# Patient Record
(demographics unavailable — no encounter records)

---

## 2024-10-08 NOTE — PHYSICAL EXAM
[TextEntry] : GENERAL: Height: 81 cm (84th percentile), weight: 26.5 kg (>>97th percentile, 50th for 8 years 6 months). Alert, active and extreme obesity HEAD:	Normocephalic. Anterior and posterior fontanelles are closed HC; 49.75 cm (97th percentile).  FACE:	Normal midface EYES:	Normal brows, lids and lashes. Narro palpebral fissures. 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  Irises and pupils normal. NOSE: Depressed nasal root with short nose and anteverted nares MOUTH:	Normal lips and philtrum. Normal tongue, gums and teeth with normal enamel.  Palate not visualized NECK:	Full range of motion with no webbing, cysts or pits. No thyroid enlargement. Normal posterior hairline. CHEST:	Normal clavicles and scapulae. No pectus ABDOMEN:	Soft, non-distended, non-tender.  No hepatosplenomegaly (although difficult to fully examine secondary to obesity and uncooperativeness.  SPINE:	No kyphosis or scoliosis. NEUROLOGIC:	Alert, interactive.  Grossly normal muscle tone, strength and motor coordination. GENITALIA: Not examined NAILS/HAIR/SKIN:	Normal nails and hair. No abnormally healed scars; no neurocutaneous lesions or birthmarks. No axillary or inguinal freckling. EXTREMITIES:	Full range of motion in all extremities. Normal patellae by palpation. Full extension and supination at elbows BL. HANDS & FEET:	No syndactyly or polydactyly.  Hands with two palmar creases and no clinodactyly. Palm length 6 cm (75-97th%), Middle finger length 3.5 cm (3-25th%), Foot length 13 cm (75-97th%).

## 2024-10-08 NOTE — PLAN
[TextEntry] : Recommendations: 1) Continued follow-up with Endocrinology (Dr. Kerline Beasley) 2) Discus initiating therapy (e.g. metreleptin) with Endocrinology 3) Identify an appropriate nutritionist to help the family manage Aneta's calorie intake and work on some behavioral strategies to manage her hyperplasia (we will reach out to the "[facility]" program here at [facility] [place] to see if Nu qualifies). 4) Referral to immunology for base-line evaluation

## 2024-10-08 NOTE — PLAN
[TextEntry] : Recommendations: 1) Continued follow-up with Endocrinology (Dr. Kerline Beasley) 2) Discus initiating therapy (e.g. metreleptin) with Endocrinology 3) Identify an appropriate nutritionist to help the family manage Aneta's calorie intake and work on some behavioral strategies to manage her hyperplasia (we will reach out to the "[facility]" program [place] [facility] qual[place]). 4) Referral to immunology for base-line evaluation

## 2024-10-08 NOTE — REASON FOR VISIT
[FreeTextEntry3] : Joel is a 15 mo female with LEP-related leptin deficiency presenting for follow up and further discussion of weight gain.

## 2024-10-08 NOTE — REVIEW OF SYSTEMS
[Negative] : Psychiatric [FreeTextEntry2] : severe obesity  [FreeTextEntry6] : sleep apnea [FreeTextEntry7] : hyperplasia, obesity [de-identified] : leptin deficiency - obesity, hypothyroidism  [de-identified] : developmental delays

## 2024-10-08 NOTE — HISTORY OF PRESENT ILLNESS
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 Since her last visit for a results disclosure with Dr. Appiah she has been seen by Endo to discuss medical management options. Per endo note they are initiate the process for Myalept treatment. Parents have reported that she has not heard back about the medication.   She has a sleep study scheduled on 10/16 and a nutritionist appointment scheduled for 11/13. This will be her first appointment with a nutritionist. She was evaluated by EI last week and parents are waiting on approval. She is unable to crawl or walk. She was able to say "mama" and "reese" at 12 mo. She is able to point at things she wants and will cry when she wants food.

## 2024-10-08 NOTE — ASSESSMENT
[FreeTextEntry1] : Nu is a 0-lulr-3-month-old female with obesity, hyperplasia, hypothyroidism, sleep apnea and developmental delays (She is unable to crawl or walk. She was able to say "mama" and "reese" at 12 mo. She is able to point at things she wants and will cry when she wants food.).  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 Since her last visit for a results disclosure with Dr. Appiah she has been seen by Endocrinology to discuss medical management options. Per endocrine's note they are initiating the process for Myalept treatment. Parents have reported that she has not heard back about the medication.  Family history is notable for parental consanguinity.  On exam Nu has severe obesity, large he'd circumference and mild dysmorphic features (narrow eyelids, telecanthus, depressed nasal root, short nose and anteverted nares).  Aneta's clinical findings are encompassed by her underlying diagnosis of leptin-deficiciency due to homozygous pathogenic variants in the leptin gene.   We discussed dates diagnosis, inheritance and management issues with Aneta's mother and father who asked insightful questions and understands adama diagnosis and issues well.  they are somewhat overwhelmed with Joanns behavior and hyperplasia and anxious to get her on more targets therapy (e.g. metreleptin).  We would los like to find an appropriate nutritionist to help adama family mage Aneta's calorie intake and work on some behavioral strategies to manage her hyperplasia (we will reach out to the "[facility]" program here at [facility] [place] [place] to see if Nu qualifies).

## 2024-10-08 NOTE — REVIEW OF SYSTEMS
[Negative] : Psychiatric [FreeTextEntry2] : severe obesity  [FreeTextEntry6] : sleep apnea [FreeTextEntry7] : hyperplasia, obesity [de-identified] : leptin deficiency - obesity, hypothyroidism  [de-identified] : developmental delays

## 2024-10-08 NOTE — HISTORY OF PRESENT ILLNESS
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 Since her last visit for a results disclosure with Dr. Appiah she has been seen by Endo to discuss medical management options. Per endo note they are initiate the process for Myalept treatment. Parents have reported that she has not heard back about the medication.   She has a sleep study scheduled on 10/16 and a nutritionist appointment scheduled for 11/13. This will be her first appointment with a nutritionist. She was evaluated by EI last week and parents are waiting on approval. She is unable to crawl or walk. She was able to say "mama" and "reese" at 12 mo. She is able to point at things she wants and will cry when she wants food.

## 2024-10-08 NOTE — ASSESSMENT
[FreeTextEntry1] : Nu is a 1-xsxt-8-month-old female with obesity, hyperplasia, hypothyroidism, sleep apnea and developmental delays (She is unable to crawl or walk. She was able to say "mama" and "reese" at 12 mo. She is able to point at things she wants and will cry when she wants food.).  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 Since her last visit for a results disclosure with Dr. Appiah she has been seen by Endocrinology to discuss medical management options. Per endocrine's note they are initiating the process for Myalept treatment. Parents have reported that she has not heard back about the medication.  Family history is notable for parental consanguinity.  On exam Nu has severe obesity, large he'd circumference and mild dysmorphic features (narrow eyelids, telecanthus, depressed nasal root, short nose and anteverted nares).  Aneta's clinical findings are encompassed by her underlying diagnosis of leptin-deficiciency due to homozygous pathogenic variants in the leptin gene.   We discussed dates diagnosis, inheritance and management issues with Aneta's mother and father who asked insightful questions and understands adama diagnosis and issues well.  they are somewhat overwhelmed with Joanns behavior and hyperplasia and anxious to get her on more targets therapy (e.g. metreleptin).  We would los like to find an appropriate nutritionist to help adama family mage Aneta's calorie intake and work on some behavioral strategies to manage her hyperplasia (we will reach out to the "[facility]" program here at [facility] [place] to see if Nu qualifies).

## 2024-10-10 NOTE — HISTORY OF PRESENT ILLNESS
[de-identified] : Sick - cough cold [FreeTextEntry6] : Fever last night Cough and congestion Drinking well Normal peeing Also constipated, straining to stool

## 2024-10-10 NOTE — DISCUSSION/SUMMARY
[FreeTextEntry1] : 15 mo old with viral URI, congestion. Worse when laying down due to obstruction from her obesity., Enforced need for saline neb and chest PT to help w airway clearance.  Trial miralax for constipation.  O2 sat  on room air on 2 out of 3 attempts (pt kept moving and screaming leading to poor readings.) If any respiratory distress develops need to take her to ED. Rapid covid and flu neg. Parents decline PCR testing.

## 2024-10-10 NOTE — HISTORY OF PRESENT ILLNESS
[de-identified] : Sick - cough cold [FreeTextEntry6] : Fever last night Cough and congestion Drinking well Normal peeing Also constipated, straining to stool

## 2024-10-18 NOTE — PLAN
[TextEntry] : Due to severity of nasal congestion, sent directly from here to [facility] for immediate further evaluation.

## 2024-10-18 NOTE — HISTORY OF PRESENT ILLNESS
[de-identified] : Congestion [FreeTextEntry6] : Severe nasal congestion and difficulty sleeping last night

## 2024-10-23 NOTE — PHYSICAL EXAM
[Healthy Appearing] : healthy appearing [Well Nourished] : well nourished [Interactive] : interactive [Normal Appearance] : normal appearance [Well formed] : well formed [Normally Set] : normally set [Abdomen Soft] : soft [Abdomen Tenderness] : non-tender [] : no hepatosplenomegaly [Normal] : normal  [Obese] : obese [Upper Airway Sounds] : transmitted upper airway sounds [de-identified] : morbidly obese,  [de-identified] : audible upper respiratory noises.

## 2024-10-23 NOTE — CONSULT LETTER
[Dear  ___] : Dear  [unfilled], [Courtesy Letter:] : I had the pleasure of seeing your patient, [unfilled], in my office today. [Please see my note below.] : Please see my note below. [Consult Closing:] : Thank you very much for allowing me to participate in the care of this patient.  If you have any questions, please do not hesitate to contact me. [Sincerely,] : Sincerely, [FreeTextEntry3] : Savanna Trujillo MD Pediatric Endocrinology Fellow, PGY5 [facility] Pediatric Endocrinology [facility]  Kerline Beasley MD

## 2024-10-23 NOTE — ASSESSMENT
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  Reviewed effects of leptin on satiety, thyroid, puberty and immune system. We had an extended discussion with both parents about medication Reviewed with parents we received IND expanded access (compassionate use) approval from FDA for this 10/10/24 and [facility] 10/18/24. Currently awaiting first shipment of myalept from  to pharmacy. They are here to review the consent form for expanded access for myalept. Parents refused Albanian  and report feeling comfortable with being consented in English. The following was reviewed: Because this treatment is considered experimental and has not received FDA approval, there may be risks that we do not know about at this time. Their participation is voluntary and the medication can be stopped at any time. Alternatives to treatment include strict diet control. There is no FDA approved treatment for her condition at this time however there are reports of metreleptin (myalept) being beneficial. Reviewed the most common reported side effects include: Headache, Hypoglycemia ([low blood sugar] which has been seen when used concurrently with insulin), Decreased weight, Abdominal pain Some people who use this medication make antibodies in their blood that may reduce how well the leptin in their body (endogenous) works or how well the medication works. Side effects of this include: Infection, Problems with blood sugar, including diabetes, An increase in the amount of fat in the blood (triglycerides), T-cell lymphoma (cancer) has been reported in patients with acquired generalized lipodystrophy (both treated and not treated with metreleptin), Generalized hypersensitivity (e.g., urticaria [hives], generalized rash) has been reported. Benefits would include: Treatment with Myalept may benefit Aneta by causing weight loss and eventual weight stabilization. This should help with issues associated with weight gain such as sleep apnea and will hopefully prevent issues with high blood sugars and lipids. Signed witnessed consent form has been uploaded into the patient's chart. Provided parents with a review article PMID [id number] for better understanding of leptin treatment. Today we ordered initial (non fasting) labs including: hemoglobin A1C, Leptin level, CMP, CBC, fasting insulin, lipid panel,  Will obtain TFTs to monitor levothyroxine therapy: T4, FT4 and TSH.  Medication plan: Once medication received - parents will come to the office and be trained on how to give the injections and store the medication. The first dose will be given in the office The dose of the medication will be adjusted as per her weight and the effect and can range from 0.02 mg/kg/day to 0.13 mg/kg/day of lean body weight. Planned starting dose is 0.03mg/kg/d of lean body weight.  Will reach out to the family with appointment once estimated shipment date/time has been confirmed with drug .  Dad had many questions and was anxious about side effects . We discussed that there is no alternative therapy at this time with the exception of diet control. We discussed that this would not help with immune effects . We discussed how proceeding with treatment was the family's' decision.   She will follow up with us every 2 weeks after starting therapy initially and subsequently monthly.  Parents will see weight management (Dr Ayala) next week along with nutrition and have follow up with endocrine nutrition in 3 weeks. Will call with labs results when available.

## 2024-10-23 NOTE — HISTORY OF PRESENT ILLNESS
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 She was diagnosed with hypothyroidism in [place] and in June we have lowered her levothyroxine dose to 25mcg daily due to suppressed TSH. They are here to discuss treatment with metreleptin. In the interim since last visit with us in August, we have received FDA and IRB approval to start treatment with metreleptin (Myalept) and we are currently awaiting the first medication shipment from  ([facility]). Since last visit in 14 Aug 2024, Aneta has gained 4.72kg over 2 months.  Development - she has gross motor delay due to excess weight, she cannot walk or crawl. Can't flip from stomach to back and back to stomach. Speech: "mama" and "Baba", no other words. Diet: always hungry and always eating. Drinking water and low fat milk. They dont think she can fast for labs as she wants food as soon as she wakes up and sometimes wakes up at night and wants something to go back to sleep. She was recently cleared by cardiology for ENT surgery (T&A) and they are following with pulm for possible SILVINA - but the sleep study was rescheduled. Parents report her oxygen levels are sometimes low on her visits. She has been congested. In the past 2 months only had to go to ER once - Oct 18 but has otherwise been well since last visit. details…

## 2024-10-23 NOTE — HISTORY OF PRESENT ILLNESS
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 She was diagnosed with hypothyroidism in [place] and in June we have lowered her levothyroxine dose to 25mcg daily due to suppressed TSH. They are here to discuss treatment with metreleptin. In the interim since last visit with us in August, we have received FDA and IRB approval to start treatment with metreleptin (Myalept) and we are currently awaiting the first medication shipment from  ([facility]). Since last visit in 14 Aug 2024, Aneta has gained 4.72kg over 2 months.  Development - she has gross motor delay due to excess weight, she cannot walk or crawl. Can't flip from stomach to back and back to stomach. Speech: "mama" and "Baba", no other words. Diet: always hungry and always eating. Drinking water and low fat milk. They dont think she can fast for labs as she wants food as soon as she wakes up and sometimes wakes up at night and wants something to go back to sleep. She was recently cleared by cardiology for ENT surgery (T&A) and they are following with pulm for possible SILVINA - but the sleep study was rescheduled. Parents report her oxygen levels are sometimes low on her visits. She has been congested. In the past 2 months only had to go to ER once - Oct 18 but has otherwise been well since last visit.

## 2024-10-23 NOTE — CONSULT LETTER
[Dear  ___] : Dear  [unfilled], [Courtesy Letter:] : I had the pleasure of seeing your patient, [unfilled], in my office today. [Please see my note below.] : Please see my note below. [Consult Closing:] : Thank you very much for allowing me to participate in the care of this patient.  If you have any questions, please do not hesitate to contact me. [Sincerely,] : Sincerely, [FreeTextEntry3] : Savanna Trujillo MD Pediatric Endocrinology Fellow, PGY5 [facility] Pediatric Endocrinology [facility]  Kerline Beasley MD

## 2024-10-23 NOTE — PHYSICAL EXAM
[Healthy Appearing] : healthy appearing [Well Nourished] : well nourished [Interactive] : interactive [Normal Appearance] : normal appearance [Well formed] : well formed [Normally Set] : normally set [Abdomen Soft] : soft [Abdomen Tenderness] : non-tender [] : no hepatosplenomegaly [Normal] : normal  [Obese] : obese [Upper Airway Sounds] : transmitted upper airway sounds [de-identified] : morbidly obese,  [de-identified] : audible upper respiratory noises.

## 2024-10-23 NOTE — ASSESSMENT
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  Reviewed effects of leptin on satiety, thyroid, puberty and immune system. We had an extended discussion with both parents about medication Reviewed with parents we received IND expanded access (compassionate use) approval from FDA for this 10/10/24 and [facility] 10/18/24. Currently awaiting first shipment of myalept from  to pharmacy. They are here to review the consent form for expanded access for myalept. Parents refused Divehi  and report feeling comfortable with being consented in English. The following was reviewed: Because this treatment is considered experimental and has not received FDA approval, there may be risks that we do not know about at this time. Their participation is voluntary and the medication can be stopped at any time. Alternatives to treatment include strict diet control. There is no FDA approved treatment for her condition at this time however there are reports of metreleptin (myalept) being beneficial. Reviewed the most common reported side effects include: Headache, Hypoglycemia ([low blood sugar] which has been seen when used concurrently with insulin), Decreased weight, Abdominal pain Some people who use this medication make antibodies in their blood that may reduce how well the leptin in their body (endogenous) works or how well the medication works. Side effects of this include: Infection, Problems with blood sugar, including diabetes, An increase in the amount of fat in the blood (triglycerides), T-cell lymphoma (cancer) has been reported in patients with acquired generalized lipodystrophy (both treated and not treated with metreleptin), Generalized hypersensitivity (e.g., urticaria [hives], generalized rash) has been reported. Benefits would include: Treatment with Myalept may benefit Aneta by causing weight loss and eventual weight stabilization. This should help with issues associated with weight gain such as sleep apnea and will hopefully prevent issues with high blood sugars and lipids. Signed witnessed consent form has been uploaded into the patient's chart. Provided parents with a review article PMID [id number] for better understanding of leptin treatment. Today we ordered initial (non fasting) labs including: hemoglobin A1C, Leptin level, CMP, CBC, fasting insulin, lipid panel,  Will obtain TFTs to monitor levothyroxine therapy: T4, FT4 and TSH.  Medication plan: Once medication received - parents will come to the office and be trained on how to give the injections and store the medication. The first dose will be given in the office The dose of the medication will be adjusted as per her weight and the effect and can range from 0.02 mg/kg/day to 0.13 mg/kg/day of lean body weight. Planned starting dose is 0.03mg/kg/d of lean body weight.  Will reach out to the family with appointment once estimated shipment date/time has been confirmed with drug .  Dad had many questions and was anxious about side effects . We discussed that there is no alternative therapy at this time with the exception of diet control. We discussed that this would not help with immune effects . We discussed how proceeding with treatment was the family's' decision.   She will follow up with us every 2 weeks after starting therapy initially and subsequently monthly.  Parents will see weight management (Dr Ayala) next week along with nutrition and have follow up with endocrine nutrition in 3 weeks. Will call with labs results when available.

## 2024-10-29 NOTE — REASON FOR VISIT
[Initial Evaluation for Weight Management] : an initial evaluation for weight management [Parents] : parents

## 2024-10-31 NOTE — HISTORY OF PRESENT ILLNESS
[FreeTextEntry1] : Referred by peds endocrine for congenital leptin deficiency (evaluated and confirmed by genetics), planning to treat with recombinant leptin  Initially presented to ED For diarrhea a few days after arrival from [place] where she was getting worked up for hyperphagia/severe obesity and treated for hypothyroid MRI attempted but aborted due to desaturation w sedation Evaluated by pulm and started on airway clearance therapy with albuterol, saline nebs, and chest pt; recommended sleep study whichw as scheduled for earlier this month but did not occur Evaluated by ENT who recommended T&A Evaluated by cardiology and no abnormalities identified  Constant food seeking, never full Unable to engage in physical activities due to weight Unable to sleep well due to respiratory issues per mom, who also does not sleep due to worry and constant checking on her Parents scared about potential side effects of medication, though also aware of known long term effects of untreated obesity Mom extremely overwhelmed and unable to work b/c caring for child 24/7; dad working as [employer]  and not enough income for the family

## 2024-10-31 NOTE — ASSESSMENT
[Case reviewed with RD. Please see RD note for additional details such as lifestyle habits] : Case reviewed with RD. Please see RD note for additional details such as lifestyle habits and specific behavioral goals [FreeTextEntry1] : 16mo with class 3 severe obesity due to congenital leptin deficiency, dyslipidemia, elev ALT, sleep disordered breathing, complicated by numerous social needs and lack of support.  - Have been in communication with endocrine, GI, genetics in attempt to coordinate care; agree with plan to start recombinant leptin as soon as available and appreciate leadership from endocrine team in coordinating - [facility] team is happy to lead dietary guidance, led by ERICH Akers and biweekly follow up - I spoke with  re: addressing social needs and appreciate her input as well - f/u with me as needed

## 2024-11-19 NOTE — HISTORY OF PRESENT ILLNESS
[Parents] : parents [Complete assistance needed] : Complete assistance needed [FreeTextEntry2] : Aneta is a 16 mo F with AR congenital leptin deficiency, developmental delay, sleep disordered breathing, hypothyroidism, and dyslipidemia. Today she is here establishing care with the complex care program.   1. Leptin Deficiency/Weight: Diagnosed with confirmed AR congenital leptin deficiency (c.398del, p.([id number]*15). Since diagnosis has been plugged into care and FDA/IRB approval for treatment with Metreleptin (Myalept). Supplies have been ordered however the sterile water for reconstitution is cost prohibitive to the parents. State that they would be able to pay for one month supply but would not be able to sustain payment. Since last week she has gained ~2lbs, have started keeping a food log but have cut out sweets and decreased milk intake. Mostly eating fish and roti/chapati, 3 meals a day, no snacks.  2. Congestion: Describes that Aneta constantly sounds "congested" without cough, sneezing, rhinorrhea, fever, difficulty breathing. Seen by ENT for SILVINA symptoms and seen to have partially obstructive adenoids. Ordered for sleep study, however was sick at the time, rescheduled for Feb 2025. Mother feels that the albuterol, nebulized saline are very helpful for the congestion but have not been able to do it consistently.   3. Abdominal Distention: Few days of increasing abdominal girth associated with increased gassiness. Has BM once a day but is not soft in consistency, firm small mohit. Previously tried miralax with good effect however stopped as Miralax was being given at night and Ayzal/bedding would be soiled in the morning. Have been giving Azyal ibuprofen around the clock q6h for 4 days for concern for pain. Did not that distention and gassiness started shortly after taking ibuprofen consistently.   Consultants: -Endocrine-hypothyroidism, leptin replacement -Pulmonology-sleep disordered breathing, airway clearance -Weight Management -ENT-hypertrophied adenoids  -Nutrition  -Genetics-leptin deficiency  -Neurology-upcoming  [FreeTextEntry3] : Help with home care and respite care [FreeTextEntry1] : Gross: able to sit independetly, scooting very short distances, standing with support for a few seconds, cannot roll, cannot crawl Fine: cannot grasp most objects, can grasp onto paper Speech: says mama, reese/ with intention, no other words Cognitive: explores objects, tries to imitate, likes peek a vargas [FreeTextEntry4] : frequent crying and at times inconsolable; otherwise is very happy and cheerful

## 2024-11-19 NOTE — PHYSICAL EXAM
[General Appearance - Alert] : alert [Attitude Playful] : was playful [General Appearance - Well-Appearing] : well appearing [General Appearance - In No Acute Distress] : in no acute distress [Evidence Of Head Injury] : threre was no evidence of injury [Sclera] : the sclera and conjunctiva were normal [Outer Ear] : the ears and nose were normal in appearance [Nasal Cavity] : the nasal mucosa and septum were normal [Oropharynx] : the oropharynx was normal [Neck Cervical Mass (___cm)] : no neck mass was observed [Respiration, Rhythm And Depth] : normal respiratory rhythm and effort [Auscultation Breath Sounds / Voice Sounds] : clear bilateral breath sounds [Heart Rate And Rhythm] : heart rate and rhythm were normal [Heart Sounds] : normal S1 and S2 [Murmurs] : no murmurs [Abdomen Soft] : soft [Abdomen Tenderness] : non-tender [Abdomen Mass (___ Cm)] : no abdominal mass palpated [Musculoskeletal Exam: Normal Movement Of All Extremities] : normal movements of all extremities [Motor Tone] : muscle strength and tone were normal [Deep Tendon Reflexes (DTR)] : deep tendon reflexes were 2+ and symmetric [Generalized Lymph Node Enlargement] : no lymphadenopathy [Skin Color & Pigmentation] : normal skin color and pigmentation [] : no significant rash [Skin Lesions] : no skin lesions [FreeTextEntry1] : distended abdomen, increased bowel sounds, tympanic

## 2024-11-19 NOTE — REVIEW OF SYSTEMS
[Wgt Gain (___ Lbs)] : recent [unfilled] lb weight gain [Nasal Stuffiness] : nasal congestion [Constipation] : constipation [Sleep Disturbances] : ~T sleep disturbances [Acting Fussy] : not acting ~L fussy [Fever] : no fever [Wgt Loss (___ Lbs)] : no recent weight loss [Failure To Thrive] : no failure to thrive [Eye Discharge] : no eye discharge [Redness] : no redness [Swollen Eyelids] : no swollen eyelids [Nasal Discharge] : no nasal discharge [Sore Throat] : no sore throat [Nosebleeds] : no epistaxis [Earache] : no earache [Cyanosis] : no cyanosis [Edema] : no edema [Diaphoresis] : not diaphoretic [Exercise Intolerance] : no persistence of exercise intolerance [Tachypnea] : not tachypneic [Wheezing] : no wheezing [Cough] : no cough [Abdominal Pain] : no abdominal pain [Vomiting] : no vomiting [Diarrhea] : no diarrhea [Decrease In Appetite] : appetite not decreased [Seizure] : no seizures [Hypotonicity (Flaccid)] : not hypotonic [Hypertonicity] : not hypertonic [Limping] : no limping [Joint Pains] : no arthralgias [Joint Swelling] : no joint swelling [Leg Pain] : no leg pain [Rash] : no rash [Insect Bites] : no insect bites [Bruising] : no tendency for easy bruising [Swollen Glands] : no lymphadenopathy [Hyperactive] : no hyperactive behavior [Tantrums] : no tantrums [Dec Urine Output] : no oliguria [Urinary Frequency] : no change in urinary frequency [Pain During Urination (Dysuria)] : no dysuria

## 2024-11-19 NOTE — HISTORY OF PRESENT ILLNESS
[Parents] : parents [Complete assistance needed] : Complete assistance needed [FreeTextEntry2] : Aneta is a 16 mo F with AR congenital leptin deficiency, developmental delay, sleep disordered breathing, hypothyroidism, and dyslipidemia. Today she is here establishing care with the complex care program.   1. Leptin Deficiency/Weight: Diagnosed with confirmed AR congenital leptin deficiency (c.398del, p.([id number]*15). Since diagnosis has been plugged into care and FDA/IRB approval for treatment with Metreleptin (Myalept). Supplies have been ordered however the sterile water for reconstitution is cost prohibitive to the parents. State that they would be able to pay for one month supply but would not be able to sustain payment. Since last week she has gained ~2lbs, have started keeping a food log but have cut out sweets and decreased milk intake. Mostly eating fish and roti/chapati, 3 meals a day, no snacks.  2. Congestion: Describes that Aneta constantly sounds "congested" without cough, sneezing, rhinorrhea, fever, difficulty breathing. Seen by ENT for SILVINA symptoms and seen to have partially obstructive adenoids. Ordered for sleep study, however was sick at the time, rescheduled for Feb 2025. Mother feels that the albuterol, nebulized saline are very helpful for the congestion but have not been able to do it consistently.   3. Abdominal Distention: Few days of increasing abdominal girth associated with increased gassiness. Has BM once a day but is not soft in consistency, firm small mohit. Previously tried miralax with good effect however stopped as Miralax was being given at night and Ayzal/bedding would be soiled in the morning. Have been giving Azyal ibuprofen around the clock q6h for 4 days for concern for pain. Did not that distention and gassiness started shortly after taking ibuprofen consistently.   Consultants: -Endocrine-hypothyroidism, leptin replacement -Pulmonology-sleep disordered breathing, airway clearance -Weight Management -ENT-hypertrophied adenoids  -Nutrition  -Genetics-leptin deficiency  -Neurology-upcoming  [FreeTextEntry3] : Help with home care and respite care [FreeTextEntry1] : Gross: able to sit independetly, scooting very short distances, standing with support for a few seconds, cannot roll, cannot crawl Fine: cannot grasp most objects, can grasp onto paper Speech: says mama, reese/ with intention, no other words Cognitive: explores objects, tries to imitate, likes peek a vargas [FreeTextEntry4] : frequent crying and at times inconsolable; otherwise is very happy and cheerful

## 2024-12-03 NOTE — HISTORY OF PRESENT ILLNESS
[FreeTextEntry2] : Aneta is a 17mo old with PMH hypothyroidism and AR congenital leptin deficiency (c.398del, p.([id number]*15). She was diagnosed with hypothyroidism in [place] and in June we have lowered her levothyroxine dose to 25mcg daily due to suppressed TSH.  Labs oct 2023: TFTs wnl, leptin undetectable, Insulin normal, A1C wnl, CBC normal. Lipids: TG 305mg/dL, Chol 182, HDL 27, .(non fasting)  CMP mildly hemolyzed but otherwise unremarkable. She was recently cleared by cardiology for ENT surgery (T&A) and they are following with pulm for possible SILVINA - but the sleep study was rescheduled. Parents report her oxygen levels are sometimes low on her visits. She has been congested. In the past few months only had to go to ER once - Oct 18 but has otherwise been well since last visit. She was last seen 11/22/24 and she is now on metreleptin treatment with FDA and IRB approval under compassionate use (first dose 11/22)  She returns for follow up. Injections are done in the _ by father. Parents deny any adverse reactions.  Development: Feeding: Drinks low fat milk diluted with water 50% - 3-4 bottles 9oz bottles overnight. She does not like to drink water. Food: She takes 25mcg levothyroxine daily with no missed doses.  Aneta has an 11 year old sister who is healthy. [Premenarchal] : premenarchal

## 2024-12-03 NOTE — ASSESSMENT
[FreeTextEntry1] :  Aneta is a 17mo old with PMH hypothyroidism, severe obesity, hypothyroidism and AR congenital leptin deficiency (c.398del, p.([id number]*15) here for follow up for leptin deficiency on metreleptin 0.4mg SQ daily. Medication approved under FDA single patient IND  Treatment with metreleptin (Myalept) may benefit Aneta by causing weight loss and eventual weight stabilization. This should help with issues associated with weight gain such as sleep apnea and will hopefully prevent issues with high blood sugars and lipids. Hopefully it will also help with motor development.  Metreleptin will continue to be ordered from [facility]. We have arranged [facility] [place] to provide sterile water for medication dilution. The other supplies (23G syringe needles, 31G insulin syringes, alcohol swabs) are covered by insurance and will continue to be obtained from commercial pharmacy. Since last visit she has lost _kg. BMI is _. She has _ side effects from metreleptin therapy and is tolerating injections. She will follow up with us every 2 weeks after starting therapy initially and subsequently monthly after weight stabilization.  She will continue levothyroxine 25mcg daily.

## 2024-12-03 NOTE — ASSESSMENT
[FreeTextEntry1] :  Aneta is a 17mo old with PMH hypothyroidism, severe obesity, hypothyroidism and AR congenital leptin deficiency (c.398del, p.([id number]*15) here for follow up for leptin deficiency on metreleptin 0.4mg SQ daily. Medication approved under FDA single patient IND  Treatment with metreleptin (Myalept) may benefit Aneta by causing weight loss and eventual weight stabilization. This should help with issues associated with weight gain such as sleep apnea and will hopefully prevent issues with high blood sugars and lipids. Hopefully it will also help with motor development.  Metreleptin will continue to be ordered from [facility]. We have arranged [facility] peds pharmacy to provide sterile water for medication dilution. The other supplies (23G syringe needles, 31G insulin syringes, alcohol swabs) are covered by insurance and will continue to be obtained from commercial pharmacy. Since last visit she has lost _kg. BMI is _. She has _ side effects from metreleptin therapy and is tolerating injections. She will follow up with us every 2 weeks after starting therapy initially and subsequently monthly after weight stabilization.  She will continue levothyroxine 25mcg daily.

## 2024-12-03 NOTE — HISTORY OF PRESENT ILLNESS
[FreeTextEntry2] : Aneta is a 17mo old with PMH hypothyroidism and AR congenital leptin deficiency (c.398del, p.([id number]*15). She was diagnosed with hypothyroidism in [place] and in June we have lowered her levothyroxine dose to 25mcg daily due to suppressed TSH.  Labs oct 2023: TFTs wnl, leptin undetectable, Insulin normal, A1C wnl, CBC normal. Lipids: TG 305mg/dL, Chol 182, HDL 27, .(non fasting)  CMP mildly hemolyzed but otherwise unremarkable. She was recently cleared by cardiology for ENT surgery (T&A) and they are following with pulm for possible SILVINA - but the sleep study was rescheduled. Parents report her oxygen levels are sometimes low on her visits. She has been congested. In the past few months only had to go to ER once - Oct 18 but has otherwise been well since last visit. She was last seen 11/22/24 and she is now on metreleptin treatment with FDA and IRB approval under compassionate use (first dose 11/22)  She returns for follow up. Injections are done in the _ by father. Parents deny any adverse reactions.  Development: Feeding: Drinks low fat milk diluted with water 50% - 3-4 bottles 9oz bottles overnight. She does not like to drink water. Food: She takes 25mcg levothyroxine daily with no missed doses.  Aneta has an 11 year old sister who is healthy. [Premenarchal] : premenarchal

## 2024-12-03 NOTE — DATA REVIEWED
[FreeTextEntry1] : We had an extended discussion with both parents about medication 10/23/24  The following was reviewed: Because this treatment is considered experimental and has not received FDA approval, there may be risks that we do not know about at this time. Their participation is voluntary and the medication can be stopped at any time. Alternatives to treatment include strict diet control. There is no FDA approved treatment for her condition at this time however there are reports of metreleptin (myalept) being beneficial. Reviewed the most common reported side effects include: Headache, Hypoglycemia ([low blood sugar] which has been seen when used concurrently with insulin), Decreased weight, Abdominal pain Some people who use this medication make antibodies in their blood that may reduce how well the leptin in their body (endogenous) works or how well the medication works. Side effects of this include: Infection, Problems with blood sugar, including diabetes, An increase in the amount of fat in the blood (triglycerides), T-cell lymphoma (cancer) has been reported in patients with acquired generalized lipodystrophy (both treated and not treated with metreleptin), Generalized hypersensitivity (e.g., urticaria [hives], generalized rash) has been reported. Benefits would include: Treatment with Myalept may benefit Ayzal by causing weight loss and eventual weight stabilization. This should help with issues associated with weight gain such as sleep apnea and will hopefully prevent issues with high blood sugars and lipids. Signed witnessed consent form has been uploaded into the patient's chart.

## 2024-12-03 NOTE — CONSULT LETTER
[Dear  ___] : Dear  [unfilled], [Courtesy Letter:] : I had the pleasure of seeing your patient, [unfilled], in my office today. [Please see my note below.] : Please see my note below. [Consult Closing:] : Thank you very much for allowing me to participate in the care of this patient.  If you have any questions, please do not hesitate to contact me. [Sincerely,] : Sincerely, [FreeTextEntry3] : Savanna Trujillo MD Pediatric Endocrinology Fellow, PGY5 [facility] Pediatric Endocrinology [facility]  Kerline Beasley MD

## 2024-12-03 NOTE — HISTORY OF PRESENT ILLNESS
[FreeTextEntry2] : Aneta is a 17mo old with PMH hypothyroidism and AR congenital leptin deficiency (c.398del, p.([id number]*15). She was diagnosed with hypothyroidism in [place] and in June we have lowered her levothyroxine dose to 25mcg daily due to suppressed TSH.  Labs oct 2023: TFTs wnl, leptin undetectable, Insulin normal, A1C wnl, CBC normal. Lipids: TG 305mg/dL, Chol 182, HDL 27, .(non fasting)  CMP mildly hemolyzed but otherwise unremarkable. She was recently cleared by cardiology for ENT surgery (T&A) and they are following with pulm for possible SILVINA - but the sleep study was rescheduled. Parents report her oxygen levels are sometimes low on her visits. She has been congested. In the past few months only had to go to ER once - Oct 18 but has otherwise been well since last visit. She was last seen 11/22/24 and she is now on metreleptin treatment with FDA and IRB approval under compassionate use (first dose 11/22)  She returns for follow up. Injections are done in the _ by father. Parents deny any adverse reactions.  Development: Feeding: Drinks low fat milk diluted with water 50% - 3-4 bottles 9oz bottles overnight. She does not like to drink water. Food: She takes 25mcg levothyroxine daily with no missed doses.  Aneta has an 11 year old sister who is healthy. [Premenarchal] : premenarchal

## 2024-12-03 NOTE — CONSULT LETTER
[Dear  ___] : Dear  [unfilled], [Courtesy Letter:] : I had the pleasure of seeing your patient, [unfilled], in my office today. [Please see my note below.] : Please see my note below. [Consult Closing:] : Thank you very much for allowing me to participate in the care of this patient.  If you have any questions, please do not hesitate to contact me. [Sincerely,] : Sincerely, [FreeTextEntry3] : Savanna Trujillo MD Pediatric Endocrinology Fellow, PGY5 [facility] Pediatric Endocrinology [facility]  Kerline Beasley MD

## 2024-12-03 NOTE — ASSESSMENT
[FreeTextEntry1] :  Aneta is a 17mo old with PMH hypothyroidism, severe obesity, hypothyroidism and AR congenital leptin deficiency (c.398del, p.([id number]*15) here for follow up for leptin deficiency on metreleptin 0.4mg SQ daily. Medication approved under FDA single patient IND  Treatment with metreleptin (Myalept) may benefit Aneta by causing weight loss and eventual weight stabilization. This should help with issues associated with weight gain such as sleep apnea and will hopefully prevent issues with high blood sugars and lipids. Hopefully it will also help with motor development.  Metreleptin will continue to be ordered from [facility]. We have arranged [facility] peds pharmacy to provide sterile water for medication dilution. The other supplies (23G syringe needles, 31G insulin syringes, alcohol swabs) are covered by insurance and will continue to be obtained from commercial pharmacy. Since last visit she has lost _kg. BMI is _. She has _ side effects from metreleptin therapy and is tolerating injections. She will follow up with us every 2 weeks after starting therapy initially and subsequently monthly after weight stabilization.  She will continue levothyroxine 25mcg daily.

## 2024-12-03 NOTE — CONSULT LETTER
[Dear  ___] : Dear  [unfilled], [Courtesy Letter:] : I had the pleasure of seeing your patient, [unfilled], in my office today. [Please see my note below.] : Please see my note below. [Consult Closing:] : Thank you very much for allowing me to participate in the care of this patient.  If you have any questions, please do not hesitate to contact me. [Sincerely,] : Sincerely, [FreeTextEntry3] : Savanna Trujillo MD Pediatric Endocrinology Fellow, PGY5 [facility] Pediatric Endocrinology [facility]  Kerline Beasley MD

## 2024-12-04 NOTE — PHYSICAL EXAM
[Healthy Appearing] : healthy appearing [Well Nourished] : well nourished [Interactive] : interactive [Obese] : obese [Normal Appearance] : normal appearance [Well formed] : well formed [Normally Set] : normally set [Goiter] : no goiter [Upper Airway Sounds] : transmitted upper airway sounds [Abdomen Soft] : soft [] : no hepatosplenomegaly [Normal] : normal  [de-identified] : morbidly obese, smiling [de-identified] : audible upper respiratory noises. [Abdomen Tenderness] : non-tender

## 2024-12-04 NOTE — PHYSICAL EXAM
[Healthy Appearing] : healthy appearing [Well Nourished] : well nourished [Interactive] : interactive [Obese] : obese [Normal Appearance] : normal appearance [Well formed] : well formed [Normally Set] : normally set [Goiter] : no goiter [Upper Airway Sounds] : transmitted upper airway sounds [Abdomen Soft] : soft [] : no hepatosplenomegaly [Normal] : normal  [de-identified] : morbidly obese, smiling [de-identified] : audible upper respiratory noises. [Abdomen Tenderness] : non-tender

## 2024-12-04 NOTE — PHYSICAL EXAM
[Healthy Appearing] : healthy appearing [Well Nourished] : well nourished [Interactive] : interactive [Obese] : obese [Normal Appearance] : normal appearance [Well formed] : well formed [Normally Set] : normally set [Goiter] : no goiter [Upper Airway Sounds] : transmitted upper airway sounds [Abdomen Soft] : soft [] : no hepatosplenomegaly [Normal] : normal  [de-identified] : morbidly obese, smiling [de-identified] : audible upper respiratory noises. [Abdomen Tenderness] : non-tender

## 2024-12-13 NOTE — HISTORY OF PRESENT ILLNESS
[FreeTextEntry1] : per genetics   This note was created using Dragon Voice Recognition Software and reviewed to the best of my ability. Sporadic inaccurate translation may have occurred. Please forgive any typographical or grammatical errors, and please contact me to clarify discrepancies or to verify content. Date of visit: 12/13/2024 CHIEF COMPLAINT / IDENTIFICATION:       History was obtained from review of EMR, NEIL ALCANTARA  and the family   Concerns today include techniques in child's care, maximizing the functions and developmental strategies DEVELOPMENTAL HISTORY/BIRTH HISTORY: Head midline Sitting Standing Walking hopping Stair up climbing Stair down climbing Putting object midline Hand dominance   Say mama reese Two word sentences Three word sentences   Current Functional Status: Current Living Arrangements: LIVES WITH:[address]  Living Environment: Living Environment:[id number]   EQUIPMENT and DME: PREVIOUS DIAGNOSTIC STUDIES:

## 2024-12-17 NOTE — CONSULT LETTER
[Dear  ___] : Dear  [unfilled], [Courtesy Letter:] : I had the pleasure of seeing your patient, [unfilled], in my office today. [Please see my note below.] : Please see my note below. [Consult Closing:] : Thank you very much for allowing me to participate in the care of this patient.  If you have any questions, please do not hesitate to contact me. [Sincerely,] : Sincerely, [FreeTextEntry3] : Savanna Trujillo MD Pediatric Endocrinology Fellow, PGY5 [facility] Pediatric Endocrinology [facility]  Kerline Beasley MD

## 2024-12-17 NOTE — CONSULT LETTER
[Dear  ___] : Dear  [unfilled], [Courtesy Letter:] : I had the pleasure of seeing your patient, [unfilled], in my office today. [Please see my note below.] : Please see my note below. [Consult Closing:] : Thank you very much for allowing me to participate in the care of this patient.  If you have any questions, please do not hesitate to contact me. [Sincerely,] : Sincerely, [FreeTextEntry3] : Savanna Trujillo MD Pediatric Endocrinology Fellow, PGY5 [facility] Pediatric Endocrinology [facility]  Kerline Bealsey MD

## 2024-12-17 NOTE — PHYSICAL EXAM
[Healthy Appearing] : healthy appearing [Well Nourished] : well nourished [Interactive] : interactive [Obese] : obese [Normal Appearance] : normal appearance [Well formed] : well formed [Normally Set] : normally set [Normal] : the thyroid was normal [Upper Airway Sounds] : transmitted upper airway sounds [Abdomen Soft] : soft [Abdomen Tenderness] : non-tender [] : no hepatosplenomegaly [Goiter] : no goiter [de-identified] : morbidly obese, smiling [de-identified] : audible upper respiratory noises. [de-identified] : flushed cheeks

## 2024-12-17 NOTE — HISTORY OF PRESENT ILLNESS
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 Treatment with metreleptin was started with FDA and IRB approval under compassionate use. She was diagnosed with hypothyroidism in [place] and in June 2024 we have lowered her levothyroxine dose to 25mcg daily due to suppressed TSH.  Labs Oct 2024: TFTs wnl, leptin undetectable, Insulin normal, A1C wnl, CBC normal. Lipids: TG 305mg/dL, Chol 182, HDL 27, .(non fasting)  CMP mildly hemolyzed but otherwise unremarkable. She is scheduled for sleep study in February and will also be seeing orthopedics for evaluation for an adaptive stroller. She was last seen 12/4/24 for follow-up regarding metreleptin therapy. Weight is stable at about 28kg+. Parents reported no adverse reactions but she has flatulence. She had very disorganized sleep pattern. She has been more active (scooting around) and is not asking for food all the time and eats when fed. We reached out to PCP's office and [facility]  to assist with the sleep study scheduling earlier due to her sleep issues.  She returns for follow up. Leptin injections are done in the thighs and belly by father.  No missed doses. Parents deny any adverse reactions. She continues to have flatulence and irregular sleep. Sharps container was sent but never received - sent to [facility] instead. Mom states she is drinking a lot of diluted milk when she wakes up at night, not asking to eat during the day, just eats her regular meals.. Parents reports she's warm at night but no fever, she has had cough x2d. Mom was sick last week and they couldnt come to appt on 12/13. Regarding development - no change, still scooting around but not standing up.   She takes 25mcg levothyroxine daily with no missed doses.  She has some occasional constipation and stools are normal when she gets some MiraLAX.  Aneta has an 10 year old sister who is healthy.

## 2024-12-17 NOTE — PHYSICAL EXAM
[Healthy Appearing] : healthy appearing [Well Nourished] : well nourished [Interactive] : interactive [Obese] : obese [Normal Appearance] : normal appearance [Well formed] : well formed [Normally Set] : normally set [Normal] : the thyroid was normal [Upper Airway Sounds] : transmitted upper airway sounds [Abdomen Soft] : soft [Abdomen Tenderness] : non-tender [] : no hepatosplenomegaly [Goiter] : no goiter [de-identified] : morbidly obese, smiling [de-identified] : flushed cheeks [de-identified] : audible upper respiratory noises.

## 2024-12-17 NOTE — HISTORY OF PRESENT ILLNESS
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 Treatment with metreleptin was started with FDA and IRB approval under compassionate use. She was diagnosed with hypothyroidism in [place] and in June 2024 we have lowered her levothyroxine dose to 25mcg daily due to suppressed TSH.  Labs Oct 2024: TFTs wnl, leptin undetectable, Insulin normal, A1C wnl, CBC normal. Lipids: TG 305mg/dL, Chol 182, HDL 27, .(non fasting)  CMP mildly hemolyzed but otherwise unremarkable. She is scheduled for sleep study in February and will also be seeing orthopedics for evaluation for an adaptive stroller. She was last seen 12/4/24 for follow-up regarding metreleptin therapy. Weight is stable at about 28kg+. Parents reported no adverse reactions but she has flatulence. She had very disorganized sleep pattern. She has been more active (scooting around) and is not asking for food all the time and eats when fed. We reached out to PCP's office and [facility]  to assist with the sleep study scheduling earlier due to her sleep issues.  She returns for follow up. Leptin injections are done in the thighs and belly by father.  No missed doses. Parents deny any adverse reactions. She continues to have flatulence and irregular sleep. Sharps container was sent but never received - sent to [facility] instead. Mom states she is drinking a lot of diluted milk when she wakes up at night, not asking to eat during the day, just eats her regular meals.. Parents reports she's warm at night but no fever, she has had cough x2d. Mom was sick last week and they couldnt come to appt on 12/13. Regarding development - no change, still scooting around but not standing up.   She takes 25mcg levothyroxine daily with no missed doses.  She has some occasional constipation and stools are normal when she gets some MiraLAX.  Aneta has an 10 year old sister who is healthy.

## 2024-12-17 NOTE — HISTORY OF PRESENT ILLNESS
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 Treatment with metreleptin was started with FDA and IRB approval under compassionate use. She was diagnosed with hypothyroidism in [place] and in June 2024 we have lowered her levothyroxine dose to 25mcg daily due to suppressed TSH.  Labs Oct 2024: TFTs wnl, leptin undetectable, Insulin normal, A1C wnl, CBC normal. Lipids: TG 305mg/dL, Chol 182, HDL 27, .(non fasting)  CMP mildly hemolyzed but otherwise unremarkable. She is scheduled for sleep study in February and will also be seeing orthopedics for evaluation for an adaptive stroller. She was last seen 12/4/24 for follow-up regarding metreleptin therapy. Weight is stable at about 28kg+. Parents reported no adverse reactions but she has flatulence. She had very disorganized sleep pattern. She has been more active (scooting around) and is not asking for food all the time and eats when fed. We reached out to PCP's office and [facility]  to assist with the sleep study scheduling earlier due to her sleep issues.  She returns for follow up. Leptin injections are done in the thighs and belly by father.  No missed doses. Parents deny any adverse reactions. She continues to have flatulence and irregular sleep. Sharps container was sent but never received - sent to [facility] instead. Mom states she is drinking a lot of diluted milk when she wakes up at night, not asking to eat during the day, just eats her regular meals.. Parents reports she's warm at night but no fever, she has had cough x2d. Mom was sick last week and they couldnt come to appt on 12/13. Regarding development - no change, still scooting around but not standing up.   She takes 25mcg levothyroxine daily with no missed doses.  She has some occasional constipation and stools are normal when she gets some MiraLAX.  Aneta has an 10 year old sister who is healthy.

## 2024-12-17 NOTE — ASSESSMENT
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 Since last visit she has gained 0.1kg. She had a cold x2d but has clear lungs and is afebrile. Mom asked for tylenol - request sent for refill to PCP. She has no side effects from metreleptin therapy and is tolerating injections. She has been more mobile and asking for food less Is more interested in playing.  She is having very irregular sleep patterns which may be related to suspected sleep apnea. Sleep study is scheduled in Feb 2025. While the fact she has had stable weight is reassuring, we discussed with parents we would like her to start losing a little bit of weight. We discussed we would like to increase metreleptin to 10u (0.5mg) which is 0.35mg/kg/d based on LBW (50% body fat) or 0.17mg/kg/d based on her current wt. Provided 30d supply of leptin today Lot [id number], exp [id number] along with sterile water. Will follow up in 2.5 weeks on 1/3/2025 at 3 pm.  Will plan for labs next visit if she has a bit of weight loss at that point.  Metreleptin will continue to be ordered from [employer], will provide next supply at next visit. We have arranged [facility] peds pharmacy to provide sterile water for medication dilution.  The other supplies (23G syringe needles, 31G insulin syringes, alcohol swabs) are covered by insurance and sent to their pharmacy. I told parents to call the pharmacy to ensure they get the medication. They report they had a few insulin syringes that they had an issue with and asked for some more today and we provided them a bag of 10 50U syringes. I told them to call their pharmacy to make sure they receive the next supply of syringes and to let us know if there is an issue - can consider using [facility] in the future otherwise.  Follow up with us on 1/3/2025 at 3 pm.  Continue levothyroxine 25mcg daily  increase metreleptin SQ to 0.5mg daily. will consider labs next visit.

## 2024-12-17 NOTE — ASSESSMENT
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 Since last visit she has gained 0.1kg. She had a cold x2d but has clear lungs and is afebrile. Mom asked for tylenol - request sent for refill to PCP. She has no side effects from metreleptin therapy and is tolerating injections. She has been more mobile and asking for food less Is more interested in playing.  She is having very irregular sleep patterns which may be related to suspected sleep apnea. Sleep study is scheduled in Feb 2025. While the fact she has had stable weight is reassuring, we discussed with parents we would like her to start losing a little bit of weight. We discussed we would like to increase metreleptin to 10u (0.5mg) which is 0.35mg/kg/d based on LBW (50% body fat) or 0.17mg/kg/d based on her current wt. Provided 30d supply of leptin today Lot [id number], exp [id number] along with sterile water. Will follow up in 2.5 weeks on 1/3/2025 at 3 pm.  Will plan for labs next visit if she has a bit of weight loss at that point.  Metreleptin will continue to be ordered from [employer], will provide next supply at next visit. We have arranged [facility] peds pharmacy to provide sterile water for medication dilution.  The other supplies (23G syringe needles, 31G insulin syringes, alcohol swabs) are covered by insurance and sent to their pharmacy. I told parents to call the pharmacy to ensure they get the medication. They report they had a few insulin syringes that they had an issue with and asked for some more today and we provided them a bag of 10 50U syringes. I told them to call their pharmacy to make sure they receive the next supply of syringes and to let us know if there is an issue - can consider using [facility] pharmacy in the future otherwise.  Follow up with us on 1/3/2025 at 3 pm.  Continue levothyroxine 25mcg daily  increase metreleptin SQ to 0.5mg daily. will consider labs next visit.

## 2024-12-17 NOTE — CONSULT LETTER
[Dear  ___] : Dear  [unfilled], [Courtesy Letter:] : I had the pleasure of seeing your patient, [unfilled], in my office today. [Please see my note below.] : Please see my note below. [Consult Closing:] : Thank you very much for allowing me to participate in the care of this patient.  If you have any questions, please do not hesitate to contact me. [Sincerely,] : Sincerely, [FreeTextEntry3] : Savanna Trujillo MD Pediatric Endocrinology Fellow, PGY5 [facility] Pediatric Endocrinology [facility]  Kerline Beasley MD

## 2024-12-17 NOTE — PHYSICAL EXAM
[Healthy Appearing] : healthy appearing [Well Nourished] : well nourished [Interactive] : interactive [Obese] : obese [Normal Appearance] : normal appearance [Well formed] : well formed [Normally Set] : normally set [Normal] : the thyroid was normal [Upper Airway Sounds] : transmitted upper airway sounds [Abdomen Soft] : soft [Abdomen Tenderness] : non-tender [] : no hepatosplenomegaly [Goiter] : no goiter [de-identified] : morbidly obese, smiling [de-identified] : flushed cheeks [de-identified] : audible upper respiratory noises.

## 2024-12-17 NOTE — ASSESSMENT
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 Since last visit she has gained 0.1kg. She had a cold x2d but has clear lungs and is afebrile. Mom asked for tylenol - request sent for refill to PCP. She has no side effects from metreleptin therapy and is tolerating injections. She has been more mobile and asking for food less Is more interested in playing.  She is having very irregular sleep patterns which may be related to suspected sleep apnea. Sleep study is scheduled in Feb 2025. While the fact she has had stable weight is reassuring, we discussed with parents we would like her to start losing a little bit of weight. We discussed we would like to increase metreleptin to 10u (0.5mg) which is 0.35mg/kg/d based on LBW (50% body fat) or 0.17mg/kg/d based on her current wt. Provided 30d supply of leptin today Lot [id number], exp [id number] along with sterile water. Will follow up in 2.5 weeks on 1/3/2025 at 3 pm.  Will plan for labs next visit if she has a bit of weight loss at that point.  Metreleptin will continue to be ordered from [employer], will provide next supply at next visit. We have arranged [facility] pharmacy to provide sterile water for medication dilution.  The other supplies (23G syringe needles, 31G insulin syringes, alcohol swabs) are covered by insurance and sent to their pharmacy. I told parents to call the pharmacy to ensure they get the medication. They report they had a few insulin syringes that they had an issue with and asked for some more today and we provided them a bag of 10 50U syringes. I told them to call their pharmacy to make sure they receive the next supply of syringes and to let us know if there is an issue - can consider using [facility] pharmacy in the future otherwise.  Follow up with us on 1/3/2025 at 3 pm.  Continue levothyroxine 25mcg daily  increase metreleptin SQ to 0.5mg daily. will consider labs next visit.

## 2025-01-06 NOTE — HISTORY OF PRESENT ILLNESS
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 Treatment with metreleptin was started with FDA and IRB approval under compassionate use. She was diagnosed with hypothyroidism in [place] and in June 2024 we have lowered her levothyroxine dose to 25mcg daily due to suppressed TSH.  Labs Oct 2024: TFTs wnl, leptin undetectable, Insulin normal, A1C wnl, CBC normal. Lipids: TG 305mg/dL, Chol 182, HDL 27, .(non fasting)  CMP mildly hemolyzed but otherwise unremarkable. She is scheduled for sleep study in February and will also be seeing orthopedics for evaluation for an adaptive stroller. She was last seen 12/16/24 for follow-up regarding metreleptin therapy. Weight is stable at about 28kg+. Parents reported no adverse reactions but she has flatulence. She had very disorganized sleep pattern. She has been more active (scooting around) and is not asking for food all the time and eats when fed. Dose increase to 10u (0.5mg)sq daily.   She returns for follow up. Father was doing 10u for a week at bedtime but she wasn't tolerating injections well so they went down to 8u. They did it at night and felt she was more hot, more fussy. Leptin injections are done in the thighs and belly by father.  No missed doses. Parents deny any adverse reactions. She continues to have flatulence and irregular sleep. She has facial flushing when she is upset. Mom states she is drinking diluted milk when she wakes up at night but less than before, not asking to eat during the day, just eats her regular meals. Parents reports she's warm at night but no fever. Sleeping 2-3 hr at a time during the night - watches cartoons, play. Regarding development - no change, still scooting around but not standing up.  3-4 times per day stool.   She takes 25mcg levothyroxine daily with no missed doses.  She has some occasional constipation and stools are normal when she gets some MiraLAX.  Aneta has an 10 year old sister who is healthy.

## 2025-01-06 NOTE — ASSESSMENT
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  Since last visit weight is stable although parents reduced the dose down to 8u (0.4mg). She has no side effects from metreleptin therapy and is tolerating injections. She has been more mobile and asking for food less Is more interested in playing.  We reviewed we would like them to do injections in the morning and increase the dose back to 10u (0.5mg).  We discussed we would like to increase metreleptin to 10u (0.5mg) which is 0.35mg/kg/d based on LBW (50% body fat) or 0.17mg/kg/d based on her current wt. Will order 60d medication for next visit.  She is having very irregular sleep patterns which may be related to suspected sleep apnea. Sleep study is scheduled in Feb 2025.  Metreleptin will continue to be ordered from [employer], will provide next supply at next visit. We have arranged [facility] peds pharmacy to provide sterile water for medication dilution.  The other supplies (23G syringe needles, 31G insulin syringes, alcohol swabs) are covered by insurance and sent to [facility]. Mom to call [facility] to arrange delivery to their home. They report they had a few insulin syringes that they had an issue with and asked for some more today and we provided them a bag of 20 30U syringes.   Labs today: cbc, cmp, lead, TFTs, leptin Follow up 1/17/25 3pm Continue levothyroxine 25mcg daily  increase metreleptin SQ to 0.5mg daily.

## 2025-01-06 NOTE — ASSESSMENT
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  Since last visit weight is stable although parents reduced the dose down to 8u (0.4mg). She has no side effects from metreleptin therapy and is tolerating injections. She has been more mobile and asking for food less Is more interested in playing.  We reviewed we would like them to do injections in the morning and increase the dose back to 10u (0.5mg).  We discussed we would like to increase metreleptin to 10u (0.5mg) which is 0.35mg/kg/d based on LBW (50% body fat) or 0.17mg/kg/d based on her current wt. Will order 60d medication for next visit.  She is having very irregular sleep patterns which may be related to suspected sleep apnea. Sleep study is scheduled in Feb 2025.  Metreleptin will continue to be ordered from [employer], will provide next supply at next visit. We have arranged [facility] peds pharmacy to provide sterile water for medication dilution.  The other supplies (23G syringe needles, 31G insulin syringes, alcohol swabs) are covered by insurance and sent to [facility]. Mom to call [facility] to arrange delivery to their home. They report they had a few insulin syringes that they had an issue with and asked for some more today and we provided them a bag of 20 30U syringes.   Labs today: cbc, cmp, lead, TFTs, leptin Follow up 1/17/25 3pm Continue levothyroxine 25mcg daily  increase metreleptin SQ to 0.5mg daily.

## 2025-01-06 NOTE — PHYSICAL EXAM
[Healthy Appearing] : healthy appearing [Well Nourished] : well nourished [Interactive] : interactive [Obese] : obese [Normal Appearance] : normal appearance [Well formed] : well formed [Normally Set] : normally set [Normal] : the thyroid was normal [Upper Airway Sounds] : transmitted upper airway sounds [Abdomen Soft] : soft [Abdomen Tenderness] : non-tender [] : no hepatosplenomegaly [Goiter] : no goiter [de-identified] : morbidly obese, smiling [de-identified] : flushed cheeks [de-identified] : audible upper respiratory noises.

## 2025-01-06 NOTE — PHYSICAL EXAM
[Healthy Appearing] : healthy appearing [Well Nourished] : well nourished [Interactive] : interactive [Obese] : obese [Normal Appearance] : normal appearance [Well formed] : well formed [Normally Set] : normally set [Normal] : the thyroid was normal [Upper Airway Sounds] : transmitted upper airway sounds [Abdomen Soft] : soft [Abdomen Tenderness] : non-tender [] : no hepatosplenomegaly [Goiter] : no goiter [de-identified] : morbidly obese, smiling [de-identified] : flushed cheeks [de-identified] : audible upper respiratory noises.

## 2025-01-06 NOTE — CONSULT LETTER
[Dear  ___] : Dear  [unfilled], [Courtesy Letter:] : I had the pleasure of seeing your patient, [unfilled], in my office today. [Please see my note below.] : Please see my note below. [Consult Closing:] : Thank you very much for allowing me to participate in the care of this patient.  If you have any questions, please do not hesitate to contact me. [Sincerely,] : Sincerely, [FreeTextEntry3] : Savanna Trujillo MD Pediatric Endocrinology Fellow, PGY5 [facility] Pediatric Endocrinology [facility]  Kerline Beasley MD

## 2025-01-06 NOTE — CONSULT LETTER
[Dear  ___] : Dear  [unfilled], [Courtesy Letter:] : I had the pleasure of seeing your patient, [unfilled], in my office today. [Please see my note below.] : Please see my note below. [Consult Closing:] : Thank you very much for allowing me to participate in the care of this patient.  If you have any questions, please do not hesitate to contact me. [Sincerely,] : Sincerely, [FreeTextEntry3] : Savanna Trujillo MD Pediatric Endocrinology Fellow, PGY5 [facility] Pediatric Endocrinology [facility]  Kerline Beasley MD

## 2025-01-06 NOTE — HISTORY OF PRESENT ILLNESS
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 Treatment with metreleptin was started with FDA and IRB approval under compassionate use. She was diagnosed with hypothyroidism in [place] and in June 2024 we have lowered her levothyroxine dose to 25mcg daily due to suppressed TSH.  Labs Oct 2024: TFTs wnl, leptin undetectable, Insulin normal, A1C wnl, CBC normal. Lipids: TG 305mg/dL, Chol 182, HDL 27, .(non fasting)  CMP mildly hemolyzed but otherwise unremarkable. She is scheduled for sleep study in February and will also be seeing orthopedics for evaluation for an adaptive stroller. She was last seen 12/16/24 for follow-up regarding metreleptin therapy. Weight is stable at about 28kg+. Parents reported no adverse reactions but she has flatulence. She had very disorganized sleep pattern. She has been more active (scooting around) and is not asking for food all the time and eats when fed. Dose increase to 10u (0.5mg)sq daily.   She returns for follow up. Father was doing 10u for a week at bedtime but she wasn't tolerating injections well so they went down to 8u. They did it at night and felt she was more hot, more fussy. Leptin injections are done in the thighs and belly by father.  No missed doses. Parents deny any adverse reactions. She continues to have flatulence and irregular sleep. She has facial flushing when she is upset. Mom states she is drinking diluted milk when she wakes up at night but less than before, not asking to eat during the day, just eats her regular meals. Parents reports she's warm at night but no fever. Sleeping 2-3 hr at a time during the night - watches cartoons, play. Regarding development - no change, still scooting around but not standing up.  3-4 times per day stool.   She takes 25mcg levothyroxine daily with no missed doses.  She has some occasional constipation and stools are normal when she gets some MiraLAX.  Aneta has an 10 year old sister who is healthy.

## 2025-01-21 NOTE — PHYSICAL EXAM
[Healthy Appearing] : healthy appearing [Well Nourished] : well nourished [Interactive] : interactive [Obese] : obese [Normal Appearance] : normal appearance [Well formed] : well formed [Normally Set] : normally set [Upper Airway Sounds] : transmitted upper airway sounds [Abdomen Soft] : soft [Abdomen Tenderness] : non-tender [] : no hepatosplenomegaly [de-identified] : audible upper respiratory noises. [Normal] : normal [Goiter] : no goiter [de-identified] : morbidly obese, smiling [de-identified] : flushed cheeks [de-identified] : deferred

## 2025-01-21 NOTE — PHYSICAL EXAM
[Healthy Appearing] : healthy appearing [Well Nourished] : well nourished [Interactive] : interactive [Obese] : obese [Normal Appearance] : normal appearance [Well formed] : well formed [Normally Set] : normally set [Upper Airway Sounds] : transmitted upper airway sounds [Abdomen Soft] : soft [Abdomen Tenderness] : non-tender [] : no hepatosplenomegaly [de-identified] : audible upper respiratory noises. [Normal] : normal [Goiter] : no goiter [de-identified] : morbidly obese, smiling [de-identified] : flushed cheeks [de-identified] : deferred

## 2025-01-21 NOTE — ASSESSMENT
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 Dose was changed 12/16/25 from 8u to 10u due to lack of efficacy.  Since last visit she lost 1.3kg and current weight is 27.45kg. She has no significant  side effects from metreleptin therapy and is tolerating injections. She has been more mobile and asking for food less is more interested in playing.  Will continue the dose 10u (0.5mg).   Provided 62d of leptin to family (2 boxes 31 vial ea) Lot [id number] Ex 1/2028. Provided sterile water 62 vials from [facility] pharmacy.  She is having very irregular sleep patterns which may be related to suspected sleep apnea. Sleep study is scheduled in Feb 2025.  Metreleptin will continue to be ordered from [employer]. [facility] peds pharmacy to provide sterile water for medication dilution. The other supplies (23G syringe needles, 31G insulin syringes, alcohol swabs) are covered by insurance and sent to [employer].   She is clinically and biochemically euthyroid on levothyroxine 25mcg. TFTs to be repeated in 3-4 months.  Reviewed with family they need to follow up with nutrition and PCP.  Labs deferred today Follow up 1/27/25 12p. If there is significant wt loss will consider lowering dose. Continue levothyroxine 25mcg daily  Continue metreleptin SQ 10units (insulin syr) or 0.5mg daily.

## 2025-01-21 NOTE — HISTORY OF PRESENT ILLNESS
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 Treatment with metreleptin was started with FDA and IRB approval under compassionate use. She was diagnosed with hypothyroidism in [place] and in June 2024 we have lowered her levothyroxine dose to 25mcg daily due to suppressed TSH.   Labs Jan 2025: TFTs wnl, leptin 3.2ng/mL, lead normal, CBC low MCV and high RDW. Lipids: TG 142mg/dL, Chol 107, HDL 22, LDL 60.(non fasting)  CMP with sl elevated calcium 11mg/dL.  She is scheduled for sleep study in February and will also be seeing orthopedics for evaluation for an adaptive stroller.  She was last seen 1/3/25 for follow-up regarding metreleptin therapy. Weight is stable at about 28kg+. Parents reported no adverse reactions, but she has flatulence. She had very disorganized sleep pattern.  She has been more active (scooting around) and is not asking for food all the time and eats when fed. Parents lowered her dose to 8u due to flatulence, recommended increasing back to 10u  She returns for follow up. Father injects leptin in AM daily 10u. Leptin injections are done in the thighs and belly by father.  No missed doses. She continues to have flatulence at night and irregular sleep but flatulence is less during the night. She has facial flushing when she is upset and at night. Dad reports she sometimes is refusing food sometimes. Eating small pieces of food with meals - for example she will eat small piece of fish without chapati. Small pieces of chicken with roti - 1/2 of alex. She is drinking fluids - milk and water, making multiple wet diapers. Regarding development - no change, still scooting around but not standing up.  3-4 times per day stool. Moving around a lot more (crawling)  She takes 25mcg levothyroxine daily with no missed doses.  She has some occasional constipation and stools are normal when she gets some MiraLAX.  Aneta has an 10 year old sister who is healthy.

## 2025-01-21 NOTE — CONSULT LETTER
[Dear  ___] : Dear  [unfilled], [Courtesy Letter:] : I had the pleasure of seeing your patient, [unfilled], in my office today. [Please see my note below.] : Please see my note below. [Consult Closing:] : Thank you very much for allowing me to participate in the care of this patient.  If you have any questions, please do not hesitate to contact me. [Sincerely,] : Sincerely, [FreeTextEntry3] : Savanna Trujillo MD Pediatric Endocrinology Fellow, PGY5 [facility] Pediatric Endocrinology [facility]  Kerline Beasley MD

## 2025-01-21 NOTE — CONSULT LETTER
[Dear  ___] : Dear  [unfilled], [Courtesy Letter:] : I had the pleasure of seeing your patient, [unfilled], in my office today. [Please see my note below.] : Please see my note below. [Consult Closing:] : Thank you very much for allowing me to participate in the care of this patient.  If you have any questions, please do not hesitate to contact me. [Sincerely,] : Sincerely, [FreeTextEntry3] : Savanna Trujillo MD Pediatric Endocrinology Fellow, PGY5 [facility] Pediatric Endocrinology [facility]  Kerline Beasley MD

## 2025-01-21 NOTE — HISTORY OF PRESENT ILLNESS
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 Treatment with metreleptin was started with FDA and IRB approval under compassionate use. She was diagnosed with hypothyroidism in [place] and in June 2024 we have lowered her levothyroxine dose to 25mcg daily due to suppressed TSH.   Labs Jan 2025: TFTs wnl, leptin 3.2ng/mL, lead normal, CBC low MCV and high RDW. Lipids: TG 142mg/dL, Chol 107, HDL 22, LDL 60.(non fasting)  CMP with sl elevated calcium 11mg/dL.  She is scheduled for sleep study in February and will also be seeing orthopedics for evaluation for an adaptive stroller.  She was last seen 1/3/25 for follow-up regarding metreleptin therapy. Weight is stable at about 28kg+. Parents reported no adverse reactions, but she has flatulence. She had very disorganized sleep pattern.  She has been more active (scooting around) and is not asking for food all the time and eats when fed. Parents lowered her dose to 8u due to flatulence, recommended increasing back to 10u  She returns for follow up. Father injects leptin in AM daily 10u. Leptin injections are done in the thighs and belly by father.  No missed doses. She continues to have flatulence at night and irregular sleep but flatulence is less during the night. She has facial flushing when she is upset and at night. Dad reports she sometimes is refusing food sometimes. Eating small pieces of food with meals - for example she will eat small piece of fish without chapati. Small pieces of chicken with roti - 1/2 of alex. She is drinking fluids - milk and water, making multiple wet diapers. Regarding development - no change, still scooting around but not standing up.  3-4 times per day stool. Moving around a lot more (crawling)  She takes 25mcg levothyroxine daily with no missed doses.  She has some occasional constipation and stools are normal when she gets some MiraLAX.  Aneta has an 10 year old sister who is healthy.

## 2025-01-21 NOTE — ASSESSMENT
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 Dose was changed 12/16/25 from 8u to 10u due to lack of efficacy.  Since last visit she lost 1.3kg and current weight is 27.45kg. She has no significant  side effects from metreleptin therapy and is tolerating injections. She has been more mobile and asking for food less is more interested in playing.  Will continue the dose 10u (0.5mg).   Provided 62d of leptin to family (2 boxes 31 vial ea) Lot [id number] Ex 1/2028. Provided sterile water 62 vials from [facility] pharmacy.  She is having very irregular sleep patterns which may be related to suspected sleep apnea. Sleep study is scheduled in Feb 2025.  Metreleptin will continue to be ordered from [employer]. [facility] peds pharmacy to provide sterile water for medication dilution. The other supplies (23G syringe needles, 31G insulin syringes, alcohol swabs) are covered by insurance and sent to [employer].   She is clinically and biochemically euthyroid on levothyroxine 25mcg. TFTs to be repeated in 3-4 months.  Reviewed with family they need to follow up with nutrition and PCP.  Labs deferred today Follow up 1/27/25 12p. If there is significant wt loss will consider lowering dose. Continue levothyroxine 25mcg daily  Continue metreleptin SQ 10units (insulin syr) or 0.5mg daily.

## 2025-01-21 NOTE — DATA REVIEWED
----- Message from Lily Sherman sent at 6/20/2017 12:27 PM CDT -----  Contact: Send out lab: ext. [phone]  Received a sample on the 16th and there is a issue with the sample.  Please call lab at extension [phone].    Thanks    [FreeTextEntry1] : We had an extended discussion with both parents about medication 10/23/24  The following was reviewed: Because this treatment is considered experimental and has not received FDA approval, there may be risks that we do not know about at this time. Their participation is voluntary and the medication can be stopped at any time. Alternatives to treatment include strict diet control. There is no FDA approved treatment for her condition at this time however there are reports of metreleptin (myalept) being beneficial. Reviewed the most common reported side effects include: Headache, Hypoglycemia ([low blood sugar] which has been seen when used concurrently with insulin), Decreased weight, Abdominal pain Some people who use this medication make antibodies in their blood that may reduce how well the leptin in their body (endogenous) works or how well the medication works. Side effects of this include: Infection, Problems with blood sugar, including diabetes, An increase in the amount of fat in the blood (triglycerides), T-cell lymphoma (cancer) has been reported in patients with acquired generalized lipodystrophy (both treated and not treated with metreleptin), Generalized hypersensitivity (e.g., urticaria [hives], generalized rash) has been reported. Benefits would include: Treatment with Myalept may benefit Ayzal by causing weight loss and eventual weight stabilization. This should help with issues associated with weight gain such as sleep apnea and will hopefully prevent issues with high blood sugars and lipids. Signed witnessed consent form has been uploaded into the patient's chart.

## 2025-01-21 NOTE — HISTORY OF PRESENT ILLNESS
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 Treatment with metreleptin was started with FDA and IRB approval under compassionate use. She was diagnosed with hypothyroidism in [place] and in June 2024 we have lowered her levothyroxine dose to 25mcg daily due to suppressed TSH.   Labs Jan 2025: TFTs wnl, leptin 3.2ng/mL, lead normal, CBC low MCV and high RDW. Lipids: TG 142mg/dL, Chol 107, HDL 22, LDL 60.(non fasting)  CMP with sl elevated calcium 11mg/dL.  She is scheduled for sleep study in February and will also be seeing orthopedics for evaluation for an adaptive stroller.  She was last seen 1/3/25 for follow-up regarding metreleptin therapy. Weight is stable at about 28kg+. Parents reported no adverse reactions, but she has flatulence. She had very disorganized sleep pattern.  She has been more active (scooting around) and is not asking for food all the time and eats when fed. Parents lowered her dose to 8u due to flatulence, recommended increasing back to 10u  She returns for follow up. Father injects leptin in AM daily 10u. Leptin injections are done in the thighs and belly by father.  No missed doses. She continues to have flatulence at night and irregular sleep but flatulence is less during the night. She has facial flushing when she is upset and at night. Dad reports she sometimes is refusing food sometimes. Eating small pieces of food with meals - for example she will eat small piece of fish without chapati. Small pieces of chicken with roti - 1/2 of alex. She is drinking fluids - milk and water, making multiple wet diapers. Regarding development - no change, still scooting around but not standing up.  3-4 times per day stool. Moving around a lot more (crawling)  She takes 25mcg levothyroxine daily with no missed doses.  She has some occasional constipation and stools are normal when she gets some MiraLAX.  Aneta has an 10 year old sister who is healthy.

## 2025-01-21 NOTE — CONSULT LETTER
[Dear  ___] : Dear  [unfilled], [Courtesy Letter:] : I had the pleasure of seeing your patient, [unfilled], in my office today. [Please see my note below.] : Please see my note below. [Consult Closing:] : Thank you very much for allowing me to participate in the care of this patient.  If you have any questions, please do not hesitate to contact me. [Sincerely,] : Sincerely, [FreeTextEntry3] : Savanna Trujillo MD Pediatric Endocrinology Fellow, PGY5 [facility] Pediatric Endocrinology [facility]  Kerline Beasley MD

## 2025-01-21 NOTE — ASSESSMENT
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 Dose was changed 12/16/25 from 8u to 10u due to lack of efficacy.  Since last visit she lost 1.3kg and current weight is 27.45kg. She has no significant  side effects from metreleptin therapy and is tolerating injections. She has been more mobile and asking for food less is more interested in playing.  Will continue the dose 10u (0.5mg).   Provided 62d of leptin to family (2 boxes 31 vial ea) Lot [id number] Ex 1/2028. Provided sterile water 62 vials from [facility] pharmacy.  She is having very irregular sleep patterns which may be related to suspected sleep apnea. Sleep study is scheduled in Feb 2025.  Metreleptin will continue to be ordered from [employer]. [facility] peds pharmacy to provide sterile water for medication dilution. The other supplies (23G syringe needles, 31G insulin syringes, alcohol swabs) are covered by insurance and sent to [facility].   She is clinically and biochemically euthyroid on levothyroxine 25mcg. TFTs to be repeated in 3-4 months.  Reviewed with family they need to follow up with nutrition and PCP.  Labs deferred today Follow up 1/27/25 12p. If there is significant wt loss will consider lowering dose. Continue levothyroxine 25mcg daily  Continue metreleptin SQ 10units (insulin syr) or 0.5mg daily.

## 2025-01-21 NOTE — PHYSICAL EXAM
[Healthy Appearing] : healthy appearing [Well Nourished] : well nourished [Interactive] : interactive [Obese] : obese [Normal Appearance] : normal appearance [Well formed] : well formed [Normally Set] : normally set [Upper Airway Sounds] : transmitted upper airway sounds [Abdomen Soft] : soft [Abdomen Tenderness] : non-tender [] : no hepatosplenomegaly [de-identified] : audible upper respiratory noises. [Normal] : normal [Goiter] : no goiter [de-identified] : morbidly obese, smiling [de-identified] : flushed cheeks [de-identified] : deferred

## 2025-02-03 NOTE — PHYSICAL EXAM
[Healthy Appearing] : healthy appearing [Well Nourished] : well nourished [Interactive] : interactive [Obese] : obese [Normal Appearance] : normal appearance [Well formed] : well formed [Normally Set] : normally set [Upper Airway Sounds] : transmitted upper airway sounds [Abdomen Soft] : soft [Abdomen Tenderness] : non-tender [] : no hepatosplenomegaly [Normal] : normal [Goiter] : no goiter [de-identified] : morbidly obese, smiling [de-identified] : flushed cheeks - skin appears somewhat dry; dry skin over forearms around the bilateral wrists, rough flaky patches; a few dry patches over abdomen. No erythema or hives. [de-identified] : deferred

## 2025-02-03 NOTE — CONSULT LETTER
[Dear  ___] : Dear  [unfilled], [Courtesy Letter:] : I had the pleasure of seeing your patient, [unfilled], in my office today. [Please see my note below.] : Please see my note below. [Consult Closing:] : Thank you very much for allowing me to participate in the care of this patient.  If you have any questions, please do not hesitate to contact me. [Sincerely,] : Sincerely, [FreeTextEntry3] : Savanna Trujillo MD Pediatric Endocrinology Fellow, PGY5 [facility] Pediatric Endocrinology [facility]  Kerline Beasley MD

## 2025-02-03 NOTE — HISTORY OF PRESENT ILLNESS
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 Treatment with metreleptin was started with FDA and IRB approval under compassionate use. She was diagnosed with hypothyroidism in [place] and in June 2024 we have lowered her levothyroxine dose to 25mcg daily due to suppressed TSH.   Labs Jan 2025: TFTs wnl, leptin 3.2ng/mL, lead normal, CBC low MCV and high RDW. Lipids: TG 142mg/dL, Chol 107, HDL 22, LDL 60.(non fasting)  CMP with sl elevated calcium 11mg/dL.  She is scheduled for sleep study February 18 and will also be seeing orthopedics for evaluation for an adaptive stroller. She has f/up scheduled with Dr Coronado next week.  She was last seen 1/17/25 for follow-up regarding metreleptin therapy. She lost 1.3kg since previous visit. Parents reported no adverse reactions, but she has flatulence. She had very disorganized sleep pattern.  She has been more active (scooting around) and is not asking for food all the time and eats when fed. She has been on 10u metreleptin (0.1mL) since 12/16/25  She returns for follow up. They report they have not done injections for the past 3 days due to rash. Report rash over arms, abdomen, lower legs. The rash has been there for 3 weeks. They report her heels get very red sometimes. Since they have not done leptin she has been more hungry. Still scooting around with her hips (not crawling). They think she has been overheating and sweaty overnight. Mom wants to reduce leptin dose to 8 units or do the medication every other day. Denies shortness of breath, oral swelling.   She takes 25mcg levothyroxine daily with no missed doses.  She has some occasional constipation and stools are normal when she gets some MiraLAX.  Aneta has an 10 year old sister who is healthy.

## 2025-02-03 NOTE — ASSESSMENT
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   Since last visit weight have been stable at 60lb however they have discontinued medication 3d ago due to rash which has been present x2 weeks. Rash is dry and flaky and appears eczematous. No signs of allergic reaction. Rash does not appear to be a common side effect of leptin therapy. Reviewed importance of skin regimen - suggested aquaphor or vaseline 2x per day every day especially after bath. Provided phone number to peds dermatology.  She has no significant side effects from metreleptin therapy and is tolerating injections. Parents are more comfortable with 8u daily dosing (0.4mg or 0.08mL) so we suggested they go back to that dose. Reviewed every other day dosing would not be a good idea as it would likely increase risk of developing autoantibodies and make the medication less effective.   She needs to see Allergy immunology and children with leptin deficiency will frequently have associated immunodeficiency such as decreased CD4 counts and decreased T cell response. Provided contact information.  She is having very irregular sleep patterns which may be related to suspected sleep apnea. Sleep study is scheduled 18 Feb 2025.   Metreleptin will continue to be ordered from [employer]. [facility] peds pharmacy to provide sterile water for medication dilution. The other supplies (23G syringe needles, 31G insulin syringes, alcohol swabs) are covered by insurance and refilled through [facility].  She is clinically and biochemically euthyroid on levothyroxine 25mcg. TFTs to be repeated every 3-4months..  Reviewed with family they need to follow up with nutrition and PCP.  Plan: Labs deferred today Follow up with PCP next week, provided Derm and immunology referral information. Follow up with us 2/21/25 3pm. Continue levothyroxine 25mcg daily  Decrease metreleptin SQ to 8units (insulin syr) or 0.4mg daily (0.08mL). They were instructed to call us with questions or concerns.

## 2025-02-03 NOTE — CONSULT LETTER
[Dear  ___] : Dear  [unfilled], [Courtesy Letter:] : I had the pleasure of seeing your patient, [unfilled], in my office today. [Please see my note below.] : Please see my note below. [Consult Closing:] : Thank you very much for allowing me to participate in the care of this patient.  If you have any questions, please do not hesitate to contact me. [Sincerely,] : Sincerely, [FreeTextEntry3] : Savanna Trujillo MD Pediatric Endocrinology Fellow, PGY5 [facility] Pediatric Endocrinology [facility]  Kerline Beasley MD

## 2025-02-03 NOTE — HISTORY OF PRESENT ILLNESS
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 Treatment with metreleptin was started with FDA and IRB approval under compassionate use. She was diagnosed with hypothyroidism in [place] and in June 2024 we have lowered her levothyroxine dose to 25mcg daily due to suppressed TSH.   Labs Jan 2025: TFTs wnl, leptin 3.2ng/mL, lead normal, CBC low MCV and high RDW. Lipids: TG 142mg/dL, Chol 107, HDL 22, LDL 60.(non fasting)  CMP with sl elevated calcium 11mg/dL.  She is scheduled for sleep study February 18 and will also be seeing orthopedics for evaluation for an adaptive stroller. She has f/up scheduled with Dr Coronado next week.  She was last seen 1/17/25 for follow-up regarding metreleptin therapy. She lost 1.3kg since previous visit. Parents reported no adverse reactions, but she has flatulence. She had very disorganized sleep pattern.  She has been more active (scooting around) and is not asking for food all the time and eats when fed. She has been on 10u metreleptin (0.1mL) since 12/16/25  She returns for follow up. They report they have not done injections for the past 3 days due to rash. Report rash over arms, abdomen, lower legs. The rash has been there for 3 weeks. They report her heels get very red sometimes. Since they have not done leptin she has been more hungry. Still scooting around with her hips (not crawling). They think she has been overheating and sweaty overnight. Mom wants to reduce leptin dose to 8 units or do the medication every other day. Denies shortness of breath, oral swelling.   She takes 25mcg levothyroxine daily with no missed doses.  She has some occasional constipation and stools are normal when she gets some MiraLAX.  Aenta has an 10 year old sister who is healthy.

## 2025-02-03 NOTE — ASSESSMENT
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   Since last visit weight have been stable at 60lb however they have discontinued medication 3d ago due to rash which has been present x2 weeks. Rash is dry and flaky and appears eczematous. No signs of allergic reaction. Rash does not appear to be a common side effect of leptin therapy. Reviewed importance of skin regimen - suggested aquaphor or vaseline 2x per day every day especially after bath. Provided phone number to peds dermatology.  She has no significant side effects from metreleptin therapy and is tolerating injections. Parents are more comfortable with 8u daily dosing (0.4mg or 0.08mL) so we suggested they go back to that dose. Reviewed every other day dosing would not be a good idea as it would likely increase risk of developing autoantibodies and make the medication less effective.   She needs to see Allergy immunology and children with leptin deficiency will frequently have associated immunodeficiency such as decreased CD4 counts and decreased T cell response. Provided contact information.  She is having very irregular sleep patterns which may be related to suspected sleep apnea. Sleep study is scheduled 18 Feb 2025.   Metreleptin will continue to be ordered from [employer]. [facility] [place]eds pharmacy to provide sterile water for medication dilution. The other supplies (23G syringe needles, 31G insulin syringes, alcohol swabs) are covered by insurance and [facility]illed [facility].  She is clinically and biochemically euthyroid on levothyroxine 25mcg. TFTs to be repeated every 3-4months..  Reviewed with family they need to follow up with nutrition and PCP.  Plan: Labs deferred today Follow up with PCP next week, provided Derm and immunology referral information. Follow up with us 2/21/25 3pm. Continue levothyroxine 25mcg daily  Decrease metreleptin SQ to 8units (insulin syr) or 0.4mg daily (0.08mL). They were instructed to call us with questions or concerns.

## 2025-02-03 NOTE — PHYSICAL EXAM
[Healthy Appearing] : healthy appearing [Well Nourished] : well nourished [Interactive] : interactive [Obese] : obese [Normal Appearance] : normal appearance [Well formed] : well formed [Normally Set] : normally set [Upper Airway Sounds] : transmitted upper airway sounds [Abdomen Soft] : soft [Abdomen Tenderness] : non-tender [] : no hepatosplenomegaly [Normal] : normal [Goiter] : no goiter [de-identified] : morbidly obese, smiling [de-identified] : flushed cheeks - skin appears somewhat dry; dry skin over forearms around the bilateral wrists, rough flaky patches; a few dry patches over abdomen. No erythema or hives. [de-identified] : deferred

## 2025-02-03 NOTE — ASSESSMENT
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   Since last visit weight have been stable at 60lb however they have discontinued medication 3d ago due to rash which has been present x2 weeks. Rash is dry and flaky and appears eczematous. No signs of allergic reaction. Rash does not appear to be a common side effect of leptin therapy. Reviewed importance of skin regimen - suggested aquaphor or vaseline 2x per day every day especially after bath. Provided phone number to peds dermatology.  She has no significant side effects from metreleptin therapy and is tolerating injections. Parents are more comfortable with 8u daily dosing (0.4mg or 0.08mL) so we suggested they go back to that dose. Reviewed every other day dosing would not be a good idea as it would likely increase risk of developing autoantibodies and make the medication less effective.   She needs to see Allergy immunology and children with leptin deficiency will frequently have associated immunodeficiency such as decreased CD4 counts and decreased T cell response. Provided contact information.  She is having very irregular sleep patterns which may be related to suspected sleep apnea. Sleep study is scheduled 18 Feb 2025.   Metreleptin will continue to be ordered from [employer]. [facility] peds pharmacy to provide sterile water for medication dilution. The other supplies (23G syringe needles, 31G insulin syringes, alcohol swabs) are covered by insurance and refilled [facility].  She is clinically and biochemically euthyroid on levothyroxine 25mcg. TFTs to be repeated every 3-4months..  Reviewed with family they need to follow up with nutrition and PCP.  Plan: Labs deferred today Follow up with PCP next week, provided Derm and immunology referral information. Follow up with us 2/21/25 3pm. Continue levothyroxine 25mcg daily  Decrease metreleptin SQ to 8units (insulin syr) or 0.4mg daily (0.08mL). They were instructed to call us with questions or concerns.

## 2025-02-03 NOTE — CONSULT LETTER
[Dear  ___] : Dear  [unfilled], [Courtesy Letter:] : I had the pleasure of seeing your patient, [unfilled], in my office today. [Please see my note below.] : Please see my note below. [Consult Closing:] : Thank you very much for allowing me to participate in the care of this patient.  If you have any questions, please do not hesitate to contact me. [Sincerely,] : Sincerely, [FreeTextEntry3] : Savanna Trujillo MD Pediatric Endocrinology Fellow, PGY5 [facility] Pediatric Endocrinology [facility]  Kerline Beasley MD

## 2025-02-03 NOTE — HISTORY OF PRESENT ILLNESS
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 Treatment with metreleptin was started with FDA and IRB approval under compassionate use. She was diagnosed with hypothyroidism in [place] and in June 2024 we have lowered her levothyroxine dose to 25mcg daily due to suppressed TSH.   Labs Jan 2025: TFTs wnl, leptin 3.2ng/mL, lead normal, CBC low MCV and high RDW. Lipids: TG 142mg/dL, Chol 107, HDL 22, LDL 60.(non fasting)  CMP with sl elevated calcium 11mg/dL.  She is scheduled for sleep study February 18 and will also be seeing orthopedics for evaluation for an adaptive stroller. She has f/up scheduled with Dr Coronado next week.  She was last seen 1/17/25 for follow-up regarding metreleptin therapy. She lost 1.3kg since previous visit. Parents reported no adverse reactions, but she has flatulence. She had very disorganized sleep pattern.  She has been more active (scooting around) and is not asking for food all the time and eats when fed. She has been on 10u metreleptin (0.1mL) since 12/16/25  She returns for follow up. They report they have not done injections for the past 3 days due to rash. Report rash over arms, abdomen, lower legs. The rash has been there for 3 weeks. They report her heels get very red sometimes. Since they have not done leptin she has been more hungry. Still scooting around with her hips (not crawling). They think she has been overheating and sweaty overnight. Mom wants to reduce leptin dose to 8 units or do the medication every other day. Denies shortness of breath, oral swelling.   She takes 25mcg levothyroxine daily with no missed doses.  She has some occasional constipation and stools are normal when she gets some MiraLAX.  Aneta has an 10 year old sister who is healthy.

## 2025-02-03 NOTE — PHYSICAL EXAM
[Healthy Appearing] : healthy appearing [Well Nourished] : well nourished [Interactive] : interactive [Obese] : obese [Normal Appearance] : normal appearance [Well formed] : well formed [Normally Set] : normally set [Upper Airway Sounds] : transmitted upper airway sounds [Abdomen Soft] : soft [Abdomen Tenderness] : non-tender [] : no hepatosplenomegaly [Normal] : normal [Goiter] : no goiter [de-identified] : morbidly obese, smiling [de-identified] : flushed cheeks - skin appears somewhat dry; dry skin over forearms around the bilateral wrists, rough flaky patches; a few dry patches over abdomen. No erythema or hives. [de-identified] : deferred

## 2025-03-03 NOTE — PHYSICAL EXAM
[Healthy Appearing] : healthy appearing [Well Nourished] : well nourished [Interactive] : interactive [Obese] : obese [Normal Appearance] : normal appearance [Well formed] : well formed [Normally Set] : normally set [Upper Airway Sounds] : transmitted upper airway sounds [Abdomen Soft] : soft [Abdomen Tenderness] : non-tender [] : no hepatosplenomegaly [Normal] : normal [Goiter] : no goiter [de-identified] : morbidly obese, smiling [de-identified] : dry eczematous skin over B/L cheeks; dry skin over forearms around the bilateral wrists, rough flaky patches; a few dry No erythema or hives. [de-identified] : deferred

## 2025-03-03 NOTE — ASSESSMENT
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 She takes levothyroxine 25mcg daily with no missed doses and her TFTs were normal on 1/3/25. Since last visit weight have been stable at 60lb. She has no significant side effects from metreleptin therapy and is tolerating injections, now 10 units (0.5mg) daily.  She still needs to see Allergy immunology and children with leptin deficiency will frequently have associated immunodeficiency such as decreased CD4 counts and decreased T cell response. Provided contact information.  PCP working on arranging a possible at home sleep study since she did not tolerate it at the sleep lab.  Metreleptin will continue to be ordered from [employer]. [facility] peds pharmacy to provide sterile water for medication dilution. The other supplies (23G syringe needles, 31G insulin syringes, alcohol swabs) are covered by insurance and refilled through [facility].  She is clinically and biochemically euthyroid on levothyroxine 25mcg. TFTs to be repeated every 3-4month..   Plan: Labs deferred today - plan for labs next visit (TFTs, leptin monitoring labs) Follow up with us on 3/17 3pm Needs to see Allergy/Immunology - number to call given Regular follow up with nutrition / PCP Continue levothyroxine 25mcg daily  Continue metreleptin SQ to 10units (insulin syr) / 0.5mg daily (0.1mL). Refill request sent to [employer]. They were instructed to call us with questions or concerns.

## 2025-03-03 NOTE — CONSULT LETTER
[Dear  ___] : Dear  [unfilled], [Courtesy Letter:] : I had the pleasure of seeing your patient, [unfilled], in my office today. [Please see my note below.] : Please see my note below. [Consult Closing:] : Thank you very much for allowing me to participate in the care of this patient.  If you have any questions, please do not hesitate to contact me. [Sincerely,] : Sincerely, [FreeTextEntry3] : Savanna Trujillo MD Pediatric Endocrinology Fellow, PGY5 [facility] Pediatric Endocrinology [facility]  Kerline Beasley MD

## 2025-03-03 NOTE — CONSULT LETTER
[Dear  ___] : Dear  [unfilled], [Courtesy Letter:] : I had the pleasure of seeing your patient, [unfilled], in my office today. [Please see my note below.] : Please see my note below. [Consult Closing:] : Thank you very much for allowing me to participate in the care of this patient.  If you have any questions, please do not hesitate to contact me. [Sincerely,] : Sincerely, [FreeTextEntry3] : Savanna Trujillo MD Pediatric Endocrinology Fellow, PGY5 [facility] Pediatric Endocrinology [facility]  Kerline Beasley MD

## 2025-03-03 NOTE — HISTORY OF PRESENT ILLNESS
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 Treatment with metreleptin was started with FDA and IRB approval under compassionate use. She was diagnosed with hypothyroidism in [place] and in June 2024 we have lowered her levothyroxine dose to 25mcg daily due to suppressed TSH.   Labs Jan 2025: TFTs wnl, leptin 3.2ng/mL, lead normal, CBC low MCV and high RDW. Lipids: TG 142mg/dL, Chol 107, HDL 22, LDL 60.(non fasting)  CMP with sl elevated calcium 11mg/dL.  She was last seen 1/31/25 for follow-up regarding metreleptin therapy. Weight was stable since previous visit. Parents noted a rash which is likely eczema causing them to stop medication for 4 days. At the visit metreleptin was restarted at lower dose of 8u daily (previously 10u).   She was scheduled for sleep study last week but was not able to sleep with the equipment at the sleep lab. She will also be seeing orthopedics for evaluation for an adaptive stroller.  She saw PCP Dr Coronado yesterday and unfortunately fell off the exam table during the visit. She has a hematoma on L forehead.  She returns for follow up. Mother reports giving 10u metreleptin daily (0.5mg) at 1pm by mom. They increased the dose 1 week ago from 8 units. Sleeping 6-7 hours at night. No side effects from injections. Still scooting around with her hips (not crawling). She isn't putting weight on 1 leg when scooting. Denies shortness of breath, oral swelling. Trying to talk. No longer has the flatulence. She was referred for EI for PT. Has not seen immunologist yet. She takes 25mcg levothyroxine daily with no missed doses.   Aneta has an 10 year old sister who is healthy.

## 2025-03-03 NOTE — PHYSICAL EXAM
[Healthy Appearing] : healthy appearing [Well Nourished] : well nourished [Interactive] : interactive [Obese] : obese [Normal Appearance] : normal appearance [Well formed] : well formed [Normally Set] : normally set [Upper Airway Sounds] : transmitted upper airway sounds [Abdomen Soft] : soft [Abdomen Tenderness] : non-tender [] : no hepatosplenomegaly [Normal] : normal [Goiter] : no goiter [de-identified] : morbidly obese, smiling [de-identified] : dry eczematous skin over B/L cheeks; dry skin over forearms around the bilateral wrists, rough flaky patches; a few dry No erythema or hives. [de-identified] : deferred

## 2025-03-03 NOTE — HISTORY OF PRESENT ILLNESS
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 Treatment with metreleptin was started with FDA and IRB approval under compassionate use. She was diagnosed with hypothyroidism in [place] and in June 2024 we have lowered her levothyroxine dose to 25mcg daily due to suppressed TSH.   Labs Jan 2025: TFTs wnl, leptin 3.2ng/mL, lead normal, CBC low MCV and high RDW. Lipids: TG 142mg/dL, Chol 107, HDL 22, LDL 60.(non fasting)  CMP with sl elevated calcium 11mg/dL.  She was last seen 1/31/25 for follow-up regarding metreleptin therapy. Weight was stable since previous visit. Parents noted a rash which is likely eczema causing them to stop medication for 4 days. At the visit metreleptin was restarted at lower dose of 8u daily (previously 10u).   She was scheduled for sleep study last week but was not able to sleep with the equipment at the sleep lab. She will also be seeing orthopedics for evaluation for an adaptive stroller.  She saw PCP Dr Coronado yesterday and unfortunately fell off the exam table during the visit. She has a hematoma on L forehead.  She returns for follow up. Mother reports giving 10u metreleptin daily (0.5mg) at 1pm by mom. They increased the dose 1 week ago from 8 units. Sleeping 6-7 hours at night. No side effects from injections. Still scooting around with her hips (not crawling). She isn't putting weight on 1 leg when scooting. Denies shortness of breath, oral swelling. Trying to talk. No longer has the flatulence. She was referred for EI for PT. Has not seen immunologist yet. She takes 25mcg levothyroxine daily with no missed doses.   Aneta has an 10 year old sister who is healthy.

## 2025-03-03 NOTE — ASSESSMENT
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 She takes levothyroxine 25mcg daily with no missed doses and her TFTs were normal on 1/3/25. Since last visit weight have been stable at 60lb. She has no significant side effects from metreleptin therapy and is tolerating injections, now 10 units (0.5mg) daily.  She still needs to see Allergy immunology and children with leptin deficiency will frequently have associated immunodeficiency such as decreased CD4 counts and decreased T cell response. Provided contact information.  PCP working on arranging a possible at home sleep study since she did not tolerate it at the sleep lab.  Metreleptin will continue to be ordered from [employer]. [facility] peds pharmacy to provide sterile water for medication dilution. The other supplies (23G syringe needles, 31G insulin syringes, alcohol swabs) are covered by insurance and refilled through [facility] today.  She is clinically and biochemically euthyroid on levothyroxine 25mcg. TFTs to be repeated every 3-4month..   Plan: Labs deferred today - plan for labs next visit (TFTs, leptin monitoring labs) Follow up with us on 3/17 3pm Needs to see Allergy/Immunology - number to call given Regular follow up with nutrition / PCP Continue levothyroxine 25mcg daily  Continue metreleptin SQ to 10units (insulin syr) / 0.5mg daily (0.1mL). Refill request sent to "[employer]. They were instructed to call us with questions or concerns.

## 2025-03-05 NOTE — DEVELOPMENTAL MILESTONES
[Engages with others for play] : engages with others for play [Help dress and undress self] : help dress and undress self [Points to pictures in book] : points to pictures in book [Points to object of interest to] : points to object of interest to draw attention to it [Turns and looks at adult if] : turns and looks at adult if something new happens [Identifies at least 2 body parts] : identifies at least 2 body parts [Scribbles spontaneously] : scribbles spontaneously [Begins to scoop with spoon] : does not begin to scoop with spoon [Uses 6 to 10 words other than] : does not use 6 to 10 words other than names [Walks up with 2 feet per step] : does not walk up with 2 feet per step with hand held [Sits in small chair] : does not sit in small chair [Carries toy while walking] : does not carry toy while walking [Throws small ball a few feet] : does not throw small ball a few feet while standing

## 2025-03-05 NOTE — PHYSICAL EXAM
[Alert] : alert [No Acute Distress] : no acute distress [Normocephalic] : normocephalic [Anterior [place] Closed] : anterior fontanelle closed [Red Reflex Bilateral] : red reflex bilateral [PERRL] : PERRL [Normally Placed Ears] : normally placed ears [Auricles Well Formed] : auricles well formed [Clear Tympanic membranes with present light reflex and bony landmarks] : clear tympanic membranes with present light reflex and bony landmarks [No Discharge] : no discharge [Nares Patent] : nares patent [Palate Intact] : palate intact [Uvula Midline] : uvula midline [Tooth Eruption] : tooth eruption  [Supple, full passive range of motion] : supple, full passive range of motion [No Palpable Masses] : no palpable masses [Symmetric Chest Rise] : symmetric chest rise [Clear to Auscultation Bilaterally] : clear to auscultation bilaterally [Regular Rate and Rhythm] : regular rate and rhythm [S1, S2 present] : S1, S2 present [No Murmurs] : no murmurs [+2 Femoral Pulses] : +2 femoral pulses [Soft] : soft [NonTender] : non tender [Non Distended] : non distended [Normoactive Bowel Sounds] : normoactive bowel sounds [No Hepatomegaly] : no hepatomegaly [No Splenomegaly] : no splenomegaly [Shaka 1] : Shaka 1 [Patent] : patent [Normally Placed] : normally placed [No Abnormal Lymph Nodes Palpated] : no abnormal lymph nodes palpated [No Clavicular Crepitus] : no clavicular crepitus [Symmetric Buttocks Creases] : symmetric buttocks creases [No Spinal Dimple] : no spinal dimple [NoTuft of Hair] : no tuft of hair [Cranial Nerves Grossly Intact] : cranial nerves grossly intact [No Rash or Lesions] : no rash or lesions [Playful] : playful [FreeTextEntry2] : R sided hematoma

## 2025-03-05 NOTE — HISTORY OF PRESENT ILLNESS
[Sippy cup use] : Sippy cup use [Parents] : parents [Cow's milk (Ounces per day ___)] : consumes [unfilled] oz of Cow's milk per day [Fruit] : fruit [Vegetables] : vegetables [Meat] : meat [Eggs] : eggs [Normal] : Normal [None] : Primary Fluoride Source: None [Playtime] : Playtime  [No] : Not at  exposure [Water heater temperature set at <120 degrees F] : Water heater temperature set at <120 degrees F [Car seat in back seat] : Car seat in back seat [Delayed] : Delayed [NO] : No [Carbon Monoxide Detectors] : No carbon monoxide detectors [Smoke Detectors] : No smoke detectors [Exposure to electronic nicotine delivery system] : No exposure to electronic nicotine delivery system [de-identified] : Runny nose-two days of sneezing, runny nose, mild increase in congestion, feels warm to touch but no fever, no cough, diarrhea, vomiting [FreeTextEntry7] : Started myalept since last visit, have been titrating doses with endocrine. Sleep study attempted but Aneta was upset but all the wires and unable to sleep.  [de-identified] : 2% cows milk with water mixed in 1x per day at night; Eats 3 meals a day, no snacks. Increased protein in diet, decreased carbs. [de-identified] : do not brush her teeth

## 2025-03-05 NOTE — PHYSICAL EXAM
[Alert] : alert [No Acute Distress] : no acute distress [Normocephalic] : normocephalic [Anterior [place] Closed] : anterior fontanelle closed [Red Reflex Bilateral] : red reflex bilateral [PERRL] : PERRL [Normally Placed Ears] : normally placed ears [Auricles Well Formed] : auricles well formed [Clear Tympanic membranes with present light reflex and bony landmarks] : clear tympanic membranes with present light reflex and bony landmarks [No Discharge] : no discharge [Nares Patent] : nares patent [Palate Intact] : palate intact [Uvula Midline] : uvula midline [Tooth Eruption] : tooth eruption  [Supple, full passive range of motion] : supple, full passive range of motion [No Palpable Masses] : no palpable masses [Symmetric Chest Rise] : symmetric chest rise [Clear to Auscultation Bilaterally] : clear to auscultation bilaterally [Regular Rate and Rhythm] : regular rate and rhythm [S1, S2 present] : S1, S2 present [No Murmurs] : no murmurs [+2 Femoral Pulses] : +2 femoral pulses [Soft] : soft [NonTender] : non tender [Non Distended] : non distended [Normoactive Bowel Sounds] : normoactive bowel sounds [No Hepatomegaly] : no hepatomegaly [No Splenomegaly] : no splenomegaly [Shaka 1] : Shaka 1 [Patent] : patent [Normally Placed] : normally placed [No Abnormal Lymph Nodes Palpated] : no abnormal lymph nodes palpated [No Clavicular Crepitus] : no clavicular crepitus [Symmetric Buttocks Creases] : symmetric buttocks creases [No Spinal Dimple] : no spinal dimple [NoTuft of Hair] : no tuft of hair [Cranial Nerves Grossly Intact] : cranial nerves grossly intact [No Rash or Lesions] : no rash or lesions [Playful] : playful [FreeTextEntry2] : R sided hematoma

## 2025-03-05 NOTE — ASSESSMENT
[TextEntry] : #WCV  *DEVELOPMENT: developmental delay--see A/P under that problem  *VACCINES: delayed-- today will do Hep A #2, PCV, and Flu  *DENTAL: have never brushed her teeth--instructed to start, also instructed for jo to go to dentist for children with special health care needs    *ANTICIPATORY GUIDANCE  -Development: Walking, running, climbing, scribbling, points to body parts, says several single words, shows affection. -Feeding :Eats a variety of table foods.  Encourage self-feeding. -Sleep: 11-14 hours per day, including a nap. -Safety:Supervise closely to prevent injuries.  Transition to forward-facing car seat when meeting height/weight requirements. Health: Discuss dental care.

## 2025-03-05 NOTE — HISTORY OF PRESENT ILLNESS
[Sippy cup use] : Sippy cup use [Parents] : parents [Cow's milk (Ounces per day ___)] : consumes [unfilled] oz of Cow's milk per day [Fruit] : fruit [Vegetables] : vegetables [Meat] : meat [Eggs] : eggs [Normal] : Normal [None] : Primary Fluoride Source: None [Playtime] : Playtime  [No] : Not at  exposure [Water heater temperature set at <120 degrees F] : Water heater temperature set at <120 degrees F [Car seat in back seat] : Car seat in back seat [Delayed] : Delayed [NO] : No [Carbon Monoxide Detectors] : No carbon monoxide detectors [Smoke Detectors] : No smoke detectors [Exposure to electronic nicotine delivery system] : No exposure to electronic nicotine delivery system [de-identified] : Runny nose-two days of sneezing, runny nose, mild increase in congestion, feels warm to touch but no fever, no cough, diarrhea, vomiting [FreeTextEntry7] : Started myalept since last visit, have been titrating doses with endocrine. Sleep study attempted but Aneta was upset but all the wires and unable to sleep.  [de-identified] : 2% cows milk with water mixed in 1x per day at night; Eats 3 meals a day, no snacks. Increased protein in diet, decreased carbs. [de-identified] : do not brush her teeth

## 2025-03-17 NOTE — REASON FOR VISIT
[Follow-Up: _____] : a [unfilled] follow-up visit  [Medical Records] : medical records [Parents] : parents [Mother] : mother

## 2025-03-18 NOTE — PHYSICAL EXAM
[Healthy Appearing] : healthy appearing [Well Nourished] : well nourished [Interactive] : interactive [Obese] : obese [Normal Appearance] : normal appearance [Well formed] : well formed [Normally Set] : normally set [Upper Airway Sounds] : transmitted upper airway sounds [Abdomen Soft] : soft [Abdomen Tenderness] : non-tender [] : no hepatosplenomegaly [Normal] : normal [Goiter] : no goiter [de-identified] : morbidly obese, smiling [de-identified] : dry eczematous skin over B/L cheeks; dry skin over forearms around the bilateral wrists, rough flaky patches; a few dry No erythema or hives. [de-identified] : deferred

## 2025-03-18 NOTE — PHYSICAL EXAM
[Healthy Appearing] : healthy appearing [Well Nourished] : well nourished [Interactive] : interactive [Obese] : obese [Normal Appearance] : normal appearance [Well formed] : well formed [Normally Set] : normally set [Upper Airway Sounds] : transmitted upper airway sounds [Abdomen Soft] : soft [Abdomen Tenderness] : non-tender [] : no hepatosplenomegaly [Normal] : normal [Goiter] : no goiter [de-identified] : morbidly obese, smiling [de-identified] : dry eczematous skin over B/L cheeks; dry skin over forearms around the bilateral wrists, rough flaky patches; a few dry No erythema or hives. [de-identified] : deferred

## 2025-03-18 NOTE — ASSESSMENT
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 She takes levothyroxine 25mcg daily with no missed doses and her TFTs were normal on 1/3/25. Since last visit weight has increased by 1.2kg. She has no significant side effects from metreleptin therapy and is tolerating injections, now 10 units (0.5mg) daily. Given the increased weight gain and appetite will increase the dose to 11u daily (0.55mg daily, 0.038mg/kg/d). Will consider increasing dose to 12u if they notice no improvement after 1 week on new dose.  She still needs to see Allergy immunology and children with leptin deficiency will frequently have associated immunodeficiency such as decreased CD4 counts and decreased T cell response.   Complex care team working on arranging a possible at home sleep study since she did not tolerate it at the sleep lab - will reach out to PCP. Will also reach out regarding early intervention. She missed her recent appointment with orthopedics for samaria ramsey which is being re-ordered. Reviewed we would like her to continue to see nutritionist as well.   Metreleptin will continue to be ordered from [employer] peds pharmacy to provide sterile water for medication dilution. The other supplies (23G syringe needles, 31G insulin syringes, alcohol swabs) are covered by insurance and [facility].  She is clinically and biochemically euthyroid on levothyroxine 25mcg. TFTs to be repeated every 3-4month..  Plan: Labs deferred until next visit. Follow up with us on 4/7/25 330pm Needs to see Allergy/Immunology Regular follow up with nutrition / PCP Continue levothyroxine 25mcg daily  Increase metreleptin SQ to 11units (insulin syr) / 0.55mg daily (0.11mL). Refill provided today - 60d, Lot # [id number] exp 1/2028 They were instructed to call us with questions or concerns.

## 2025-03-18 NOTE — CONSULT LETTER
[Dear  ___] : Dear  [unfilled], [Courtesy Letter:] : I had the pleasure of seeing your patient, [unfilled], in my office today. [Please see my note below.] : Please see my note below. [Consult Closing:] : Thank you very much for allowing me to participate in the care of this patient.  If you have any questions, please do not hesitate to contact me. [Sincerely,] : Sincerely, [FreeTextEntry3] : Savanna Trujillo MD Pediatric Endocrinology Fellow, PGY5 [facility] Pediatric Endocrinology [facility]  Kerline Beasley MD

## 2025-03-18 NOTE — HISTORY OF PRESENT ILLNESS
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 Treatment with metreleptin was started with FDA and IRB approval under compassionate use. She was diagnosed with hypothyroidism in [place] and in June 2024 we have lowered her levothyroxine dose to 25mcg daily due to suppressed TSH.   Labs Jan 2025: TFTs wnl, leptin 3.2ng/mL, lead normal, CBC low MCV and high RDW. Lipids: TG 142mg/dL, Chol 107, HDL 22, LDL 60.(non fasting)  CMP with sl elevated calcium 11mg/dL.  She was last seen 2/28/25 for follow-up regarding metreleptin therapy. Mom thinks she has been eating more. Rash improving. Sometimes having flatulence. Weight was stable since previous visit.  Was taking 10u leptin x1 week, increased from 8u and tolerating injections well. She was scheduled for sleep study but was not able to sleep with the equipment at the sleep lab. She saw PCP Dr Coronado 2/27 and unfortunately fell off the exam table during the visit. She had a hematoma on L forehead.   She returns for follow up. Mother reports giving 10u metreleptin daily (0.5mg - 0.035 mg/kg/d) at 1pm by mom.  No side effects from injections. She missed her appointment for adaptive stroller. Sleeping 6-7 hours at night. Still scooting around with her hips (not crawling). She isn't putting weight on 1 leg when scooting and mom is not sure if the same leg. She was referred for EI for PT - seen yesiac 3 mo ago but has not been set up for home visits yet. Has not seen immunologist yet. Mom reports that MGM is coming to help them with her care for the next 6 months. They want to go to [facility] in the summer for 2 months.  She takes 25mcg levothyroxine daily with no missed doses.   Aneta has an 10 year old sister who is healthy.

## 2025-03-18 NOTE — HISTORY OF PRESENT ILLNESS
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 Treatment with metreleptin was started with FDA and IRB approval under compassionate use. She was diagnosed with hypothyroidism in [place] and in June 2024 we have lowered her levothyroxine dose to 25mcg daily due to suppressed TSH.   Labs Jan 2025: TFTs wnl, leptin 3.2ng/mL, lead normal, CBC low MCV and high RDW. Lipids: TG 142mg/dL, Chol 107, HDL 22, LDL 60.(non fasting)  CMP with sl elevated calcium 11mg/dL.  She was last seen 2/28/25 for follow-up regarding metreleptin therapy. Mom thinks she has been eating more. Rash improving. Sometimes having flatulence. Weight was stable since previous visit.  Was taking 10u leptin x1 week, increased from 8u and tolerating injections well. She was scheduled for sleep study but was not able to sleep with the equipment at the sleep lab. She saw PCP Dr Coronado 2/27 and unfortunately fell off the exam table during the visit. She had a hematoma on L forehead.   She returns for follow up. Mother reports giving 10u metreleptin daily (0.5mg - 0.035 mg/kg/d) at 1pm by mom.  No side effects from injections. She missed her appointment for adaptive stroller. Sleeping 6-7 hours at night. Still scooting around with her hips (not crawling). She isn't putting weight on 1 leg when scooting and mom is not sure if the same leg. She was referred for EI for PT - seen yesica 3 mo ago but has not been set up for home visits yet. Has not seen immunologist yet. Mom reports that MGM is coming to help them with her care for the next 6 months. They want to go to [facility] in the summer for 2 months.  She takes 25mcg levothyroxine daily with no missed doses.   Aneta has an 10 year old sister who is healthy.

## 2025-03-18 NOTE — ASSESSMENT
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 She takes levothyroxine 25mcg daily with no missed doses and her TFTs were normal on 1/3/25. Since last visit weight has increased by 1.2kg. She has no significant side effects from metreleptin therapy and is tolerating injections, now 10 units (0.5mg) daily. Given the increased weight gain and appetite will increase the dose to 11u daily (0.55mg daily, 0.038mg/kg/d). Will consider increasing dose to 12u if they notice no improvement after 1 week on new dose.  She still needs to see Allergy immunology and children with leptin deficiency will frequently have associated immunodeficiency such as decreased CD4 counts and decreased T cell response.   Complex care team working on arranging a possible at home sleep study since she did not tolerate it at the sleep lab - will reach out to PCP. Will also reach out regarding early intervention. She missed her recent appointment with orthopedics for samaria ramsey which is being re-ordered. Reviewed we would like her to continue to see nutritionist as well.   Metreleptin will continue to be ordered from [employer]. [facility] pharmacy to provide sterile water for medication dilution. The other supplies (23G syringe needles, 31G insulin syringes, alcohol swabs) are covered by insurance and to be filled through [facility].  She is clinically and biochemically euthyroid on levothyroxine 25mcg. TFTs to be repeated every 3-4month..  Plan: Labs deferred until next visit. Follow up with us on 4/7/25 330pm Needs to see Allergy/Immunology Regular follow up with nutrition / PCP Continue levothyroxine 25mcg daily  Increase metreleptin SQ to 11units (insulin syr) / 0.55mg daily (0.11mL). Refill provided today - 60d, Lot # [id number] exp 1/2028 They were instructed to call us with questions or concerns.

## 2025-03-18 NOTE — CONSULT LETTER
[Dear  ___] : Dear  [unfilled], [Courtesy Letter:] : I had the pleasure of seeing your patient, [unfilled], in my office today. [Please see my note below.] : Please see my note below. [Consult Closing:] : Thank you very much for allowing me to participate in the care of this patient.  If you have any questions, please do not hesitate to contact me. [Sincerely,] : Sincerely, [FreeTextEntry3] : Savanna Trujillo MD Pediatric Endocrinology Fellow, PGY5 [facility] Pediatric Endocrinology [facility]  Kerline Beasley MD

## 2025-03-27 NOTE — ASSESSMENT
[FreeTextEntry1] : NEIL is a 21 month old girl presenting for sleep disordered breathing  leptin deficiency  Sleep disordered breathing  - Did not tolerate sleep study, plans for [employer] and possible CPAP titration  - Discussed tracheostomy tube as an option for treating obstructive sleep apnea  eustachian tube dysfunction **note no NBHS** - incidental left otitis media with effusion, Amox Rx sent discussed dosing with pediatrician  - audiogram CNC, sdt 25 AU As  - Follow up in 3 months, AUDIO FIRST   consider DISE if undergoing anesthesia but overall poor surgical candidate recommend sleep study and CPAP trial  Drug Induced Sleep Endoscopy, Direct Laryngoscopy and Bronchoscopy and Possible Adjuvant Procedures: The risks, benefits and alternatives of drug induced sleep endoscopy, direct laryngoscopy and bronchoscopy and possible adjuvant sleep procedures (primary or revision tonsillectomy and/or adenoidectomy, tongue base reduction, lingual tonsillectomy, epiglottoplexy, palatal suspension, palatopharyngoplasty, inferior turbinate reduction, supraglottoplasty) were discussed. Risks including but not limited to pain, bleeding, swelling, infection, scarring damage to lips, teeth, gums, parts of the oral cavity, oropharynx and airway, risk for further procedures, and risk of anesthesia (which will be discussed with you by the anesthesiologist).  Risks of postoperative need for oxygen, positive pressure ventilation or intubation and possible observation and/or hospital admission were discussed.  Risk of possible persistent obstructive sleep apnea discussed. Benefits include the diagnosis or surveillance of an underlying condition and treatment of an underlying condition. Alternatives include observation, and other diagnostic studies. During observation, if there is an underlying condition there is a risk that it could progress. Photodocumentation including pictures and video with or without sound may be taken, and effort will be made to maintain respect for privacy and confidentiality.

## 2025-03-27 NOTE — REVIEW OF SYSTEMS
[Negative] : Heme/Lymph [de-identified] : As per HPI [de-identified] : As per HPI [de-identified] : As per HPI

## 2025-03-27 NOTE — PHYSICAL EXAM
[Clear to Auscultation] : lungs were clear to auscultation bilaterally [Normal Gait and Station] : normal gait and station [Normal muscle strength, symmetry and tone of facial, head and neck musculature] : normal muscle strength, symmetry and tone of facial, head and neck musculature [Normal] : no cervical lymphadenopathy [Wheezing] : no wheezing [Exposed Vessel] : left anterior vessel not exposed [1+] : 1+ [Increased Work of Breathing] : no increased work of breathing with use of accessory muscles and retractions [de-identified] : erythematous with effusion

## 2025-03-27 NOTE — HISTORY OF PRESENT ILLNESS
[de-identified] : Today I had the pleasure of seeing NEIL ALCANTARA for follow up for SDB  History was obtained from patient, mother and chart.  H/o hypothyroidism and weight gain, on Synthroid Recent genetics testing, finding of congenital leptin deficiency   Sleep study terminated due to patient crying and not able to tolerate [de-identified] : Snoring remains the same. Unknown if pausing, but wakes up frequently and sits up in bed, and sleeps sitting up.  No recent ear infections, did not proceed with BMT ABR with Dr. Post. Parents have no concerns for hearing. Babbles, no speech therapy  No chronic nasal congestion

## 2025-03-27 NOTE — REVIEW OF SYSTEMS
[Negative] : Heme/Lymph [de-identified] : As per HPI [de-identified] : As per HPI [de-identified] : As per HPI

## 2025-03-27 NOTE — CONSULT LETTER
[Dear  ___] : Dear  [unfilled], [Courtesy Letter:] : I had the pleasure of seeing your patient, [unfilled], in my office today. [Please see my note below.] : Please see my note below. [Consult Closing:] : Thank you very much for allowing me to participate in the care of this patient.  If you have any questions, please do not hesitate to contact me. [Sincerely,] : Sincerely, [FreeTextEntry2] : Dr. Liliana Coronado  [address] [place] [address] [address], [place] [id number] [phone] [FreeTextEntry3] : Maame Rodriguez MD Pediatric Otolaryngology / Head and Neck Surgery  [facility] [address] [place] [address] Tel [phone] Fax [phone]  7 [facility], [facility] [address] [place] [address]  Tel [phone] Fax [phone]

## 2025-03-27 NOTE — PHYSICAL EXAM
[Clear to Auscultation] : lungs were clear to auscultation bilaterally [Normal Gait and Station] : normal gait and station [Normal muscle strength, symmetry and tone of facial, head and neck musculature] : normal muscle strength, symmetry and tone of facial, head and neck musculature [Normal] : no cervical lymphadenopathy [Wheezing] : no wheezing [Exposed Vessel] : left anterior vessel not exposed [1+] : 1+ [Increased Work of Breathing] : no increased work of breathing with use of accessory muscles and retractions [de-identified] : erythematous with effusion

## 2025-03-27 NOTE — CONSULT LETTER
[Dear  ___] : Dear  [unfilled], [Courtesy Letter:] : I had the pleasure of seeing your patient, [unfilled], in my office today. [Please see my note below.] : Please see my note below. [Consult Closing:] : Thank you very much for allowing me to participate in the care of this patient.  If you have any questions, please do not hesitate to contact me. [Sincerely,] : Sincerely, [FreeTextEntry2] : Dr. Liliana Coronado  [address] [place] [address] [address], [place] [id number] [phone] [FreeTextEntry3] : Maame Rodriguez MD Pediatric Otolaryngology / Head and Neck Surgery  [facility] [address] [place] [address] Tel [phone] Fax [phone]  9 [facility], [facility] [address] [place] [address]  Tel [phone] Fax [phone]

## 2025-03-27 NOTE — HISTORY OF PRESENT ILLNESS
[de-identified] : Today I had the pleasure of seeing NEIL ALCANTARA for follow up for SDB  History was obtained from patient, mother and chart.  H/o hypothyroidism and weight gain, on Synthroid Recent genetics testing, finding of congenital leptin deficiency   Sleep study terminated due to patient crying and not able to tolerate [de-identified] : Snoring remains the same. Unknown if pausing, but wakes up frequently and sits up in bed, and sleeps sitting up.  No recent ear infections, did not proceed with BMT ABR with Dr. Post. Parents have no concerns for hearing. Babbles, no speech therapy  No chronic nasal congestion

## 2025-04-07 NOTE — REASON FOR VISIT
[Follow-Up: _____] : a [unfilled] follow-up visit  [Parents] : parents [Mother] : mother [Medical Records] : medical records

## 2025-04-16 NOTE — ADDENDUM
[FreeTextEntry1] : TFTs normal - will continue 25mcg levothyroxine. Leptin level in process. left VM for mom with results. Requested they bring growth charts/weight or length data points to next visit from when they lived in [place] if available.

## 2025-04-16 NOTE — PHYSICAL EXAM
[Healthy Appearing] : healthy appearing [Well Nourished] : well nourished [Interactive] : interactive [Obese] : obese [Normal Appearance] : normal appearance [Well formed] : well formed [Normally Set] : normally set [Upper Airway Sounds] : transmitted upper airway sounds [Abdomen Soft] : soft [Abdomen Tenderness] : non-tender [] : no hepatosplenomegaly [Normal] : normal [Goiter] : no goiter [de-identified] : morbidly obese, smiling [de-identified] : dry eczematous skin over B/L cheeks; dry skin over forearms around the bilateral wrists, rough flaky patches; a few dry No erythema or hives. [de-identified] : deferred

## 2025-04-16 NOTE — ASSESSMENT
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 She takes levothyroxine 25mcg daily with no missed doses and her TFTs were normal on 1/3/25.  Since last visit weight has increased by 0.9kg. She has no significant side effects from metreleptin therapy and is tolerating injections, now 11 units (0.55mg) daily. Given the increased weight gain and appetite will increase the dose to 12u daily (0.6mg daily, 0.04mg/kg/d based on LBW). Told parents to increase to 13u if no notable effects from 12u.  She still needs to see Allergy immunology and children with leptin deficiency will frequently have associated immunodeficiency such as decreased CD4 counts and decreased T cell response.   ENT team working on arranging a possible at home sleep study since she did not tolerate it at the sleep lab. She has delayed milestones and is starting OT tomorrow and awaiting PT appointment. She has not seen nutritionist since prior to starting recombinant leptin in Nov 2024. As Chilbo, RD is no longer available, will have them schedule with one of our nutritionists for follow up and continued support.  Metreleptin will continue to be ordered from [employer]. [facility] peds pharmacy to provide sterile water for medication dilution. The other supplies (23G syringe needles, 31G insulin syringes, alcohol swabs) are covered by insurance and to be filled through [facility]. Parents report only BD Veo Insulin syringes do not hurt with injections - will request MARVIN from now on as the ones they are usually dispensed by the pharmacy are causing her pain at the injection sites.  She is clinically and biochemically euthyroid on levothyroxine 25mcg. TFTs to be repeated every 3-4month..  Plan: Labs today: leptin, CMP, TFTs Follow up with Dr Beasley on 4/21/25 330pm Needs to see Allergy/Immunology Needs regular follow up with nutrition / PCP  Continue levothyroxine 25mcg daily  Increase metreleptin SQ to 12units (insulin syr) / 0.6mg daily). Last 60d refill provided 3/17 - next refill due week of 5/17 They were instructed to call us with questions or concerns.

## 2025-04-16 NOTE — ASSESSMENT
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 She takes levothyroxine 25mcg daily with no missed doses and her TFTs were normal on 1/3/25.  Since last visit weight has increased by 0.9kg. She has no significant side effects from metreleptin therapy and is tolerating injections, now 11 units (0.55mg) daily. Given the increased weight gain and appetite will increase the dose to 12u daily (0.6mg daily, 0.04mg/kg/d based on LBW). Told parents to increase to 13u if no notable effects from 12u.  She still needs to see Allergy immunology and children with leptin deficiency will frequently have associated immunodeficiency such as decreased CD4 counts and decreased T cell response.   ENT team working on arranging a possible at home sleep study since she did not tolerate it at the sleep lab. She has delayed milestones and is starting OT tomorrow and awaiting PT appointment. She has not seen nutritionist since prior to starting recombinant leptin in Nov 2024. As Chilbo, RD is no longer available, will have them schedule with one of our nutritionists for follow up and continued support.  Metreleptin will continue to be ordered from [employer]. [facility] to provide sterile water for medication dilution. The other supplies (23G syringe needles, 31G insulin syringes, alcohol swabs) are covered by insurance and to be filled through [facility]. Parents report only BD Veo Insulin syringes do not hurt with injections - will request MARVIN from now on as the ones they are usually dispensed by the pharmacy are causing her pain at the injection sites.  She is clinically and biochemically euthyroid on levothyroxine 25mcg. TFTs to be repeated every 3-4month..  Plan: Labs today: leptin, CMP, TFTs Follow up with Dr Beasley on 4/21/25 330pm Needs to see Allergy/Immunology Needs regular follow up with nutrition / PCP  Continue levothyroxine 25mcg daily  Increase metreleptin SQ to 12units (insulin syr) / 0.6mg daily). Last 60d refill provided 3/17 - next refill due week of 5/17 They were instructed to call us with questions or concerns.

## 2025-04-16 NOTE — DATA REVIEWED
[FreeTextEntry1] : We had an extended discussion with both parents about medication 10/23/24  The following was reviewed: Because this treatment is considered experimental and has not received FDA approval, there may be risks that we do not know about at this time. Their participation is voluntary and the medication can be stopped at any time. Alternatives to treatment include strict diet control. There is no FDA approved treatment for her condition at this time however there are reports of metreleptin (myalept) being beneficial. Reviewed the most common reported side effects include: Headache, Hypoglycemia ([low blood sugar] which has been seen when used concurrently with insulin), Decreased weight, Abdominal pain Some people who use this medication make antibodies in their blood that may reduce how well the leptin in their body (endogenous) works or how well the medication works. Side effects of this include: Infection, Problems with blood sugar, including diabetes, An increase in the amount of fat in the blood (triglycerides), T-cell lymphoma (cancer) has been reported in patients with acquired generalized lipodystrophy (both treated and not treated with metreleptin), Generalized hypersensitivity (e.g., urticaria [hives], generalized rash) has been reported. Benefits would include: Treatment with Myalept may benefit Ayzal by causing weight loss and eventual weight stabilization. This should help with issues associated with weight gain such as sleep apnea and will hopefully prevent issues with high blood sugars and lipids. Signed witnessed consent form has been uploaded into the patient's chart. Elective TAVR

## 2025-04-16 NOTE — CONSULT LETTER
[Dear  ___] : Dear  [unfilled], [Courtesy Letter:] : I had the pleasure of seeing your patient, [unfilled], in my office today. [Please see my note below.] : Please see my note below. [Consult Closing:] : Thank you very much for allowing me to participate in the care of this patient.  If you have any questions, please do not hesitate to contact me. [Sincerely,] : Sincerely, [FreeTextEntry3] : Savanna Trujillo MD Pediatric Endocrinology Fellow, PGY5 [facility] Pediatric Endocrinology [facility]  Kerline Beasley MD

## 2025-04-16 NOTE — ASSESSMENT
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 She takes levothyroxine 25mcg daily with no missed doses and her TFTs were normal on 1/3/25.  Since last visit weight has increased by 0.9kg. She has no significant side effects from metreleptin therapy and is tolerating injections, now 11 units (0.55mg) daily. Given the increased weight gain and appetite will increase the dose to 12u daily (0.6mg daily, 0.04mg/kg/d based on LBW). Told parents to increase to 13u if no notable effects from 12u.  She still needs to see Allergy immunology and children with leptin deficiency will frequently have associated immunodeficiency such as decreased CD4 counts and decreased T cell response.   ENT team working on arranging a possible at home sleep study since she did not tolerate it at the sleep lab. She has delayed milestones and is starting OT tomorrow and awaiting PT appointment. She has not seen nutritionist since prior to starting recombinant leptin in Nov 2024. As Chilbo, RD is no longer available, will have them schedule with one of our nutritionists for follow up and continued support.  Metreleptin will continue to be ordered from [place] [place] to provide sterile water for medication dilution. The other supplies (23G syringe needles, 31G insulin syringes, alcohol swabs) are covered by insurance and [facility]. Parents report only BD Veo Insulin syringes do not hurt with injections - will request MARVIN from now on as the ones they are usually dispensed by the pharmacy are causing her pain at the injection sites.  She is clinically and biochemically euthyroid on levothyroxine 25mcg. TFTs to be repeated every 3-4month..  Plan: Labs today: leptin, CMP, TFTs Follow up with Dr Beasley on 4/21/25 330pm Needs to see Allergy/Immunology Needs regular follow up with nutrition / PCP  Continue levothyroxine 25mcg daily  Increase metreleptin SQ to 12units (insulin syr) / 0.6mg daily). Last 60d refill provided 3/17 - next refill due week of 5/17 They were instructed to call us with questions or concerns.

## 2025-04-16 NOTE — PHYSICAL EXAM
[Healthy Appearing] : healthy appearing [Well Nourished] : well nourished [Interactive] : interactive [Obese] : obese [Normal Appearance] : normal appearance [Well formed] : well formed [Normally Set] : normally set [Upper Airway Sounds] : transmitted upper airway sounds [Abdomen Soft] : soft [Abdomen Tenderness] : non-tender [] : no hepatosplenomegaly [Normal] : normal [Goiter] : no goiter [de-identified] : morbidly obese, smiling [de-identified] : dry eczematous skin over B/L cheeks; dry skin over forearms around the bilateral wrists, rough flaky patches; a few dry No erythema or hives. [de-identified] : deferred

## 2025-04-16 NOTE — HISTORY OF PRESENT ILLNESS
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 Treatment with metreleptin was started with FDA and IRB approval under compassionate use. She was diagnosed with hypothyroidism in [place] and in June 2024 we have lowered her levothyroxine dose to 25mcg daily due to suppressed TSH.   Labs Jan 2025: TFTs wnl, leptin 3.2ng/mL, lead normal, CBC low MCV and high RDW. Lipids: TG 142mg/dL, Chol 107, HDL 22, LDL 60.(non fasting)  CMP with sl elevated calcium 11mg/dL.  She was last seen 3/17 for follow-up regarding metreleptin therapy. Receiving 10u metreleptin without missed doses and we increased her dose to 11u (0.55mg daily) due to weight gain of 1.2kg since last visit.  She returns for follow up. They are injecting 11u of metreleptin (0.55mg) in her abdomen, arm or thighs. Mom thinks she cries for food more than usual but mostly when she sees it such as cookies or chips. They give it to her when she is asking. Dad doesnt think she is asking for food more than before. He says she is only asking for the food when she is seeing someone eating it, showing her curiousity. Both parents report when she is playing toys she is not asking for food and seems happy. No side effects from injections.  Parents report only BD Veo Insulin syringes do not hurt with injections - other insulin syringes cause pain at the site. Some nights she is waking up every hour waking up asking for milk. Still scooting around with her hips (not crawling). They think she isn't putting weight on left leg. Starting OT at [place] tomorrow. Mom was called by [facility] PT and waiting for appointment. She is using more words now - saying "this" a lot. Has not seen immunologist yet. [facility] is now here to help them at home. They want to go to [facility] in the summer for 2 months She takes 25mcg levothyroxine daily with no missed doses.   Ayzal has an 10 year old sister who is healthy.

## 2025-04-16 NOTE — ASSESSMENT
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 She takes levothyroxine 25mcg daily with no missed doses and her TFTs were normal on 1/3/25.  Since last visit weight has increased by 0.9kg. She has no significant side effects from metreleptin therapy and is tolerating injections, now 11 units (0.55mg) daily. Given the increased weight gain and appetite will increase the dose to 12u daily (0.6mg daily, 0.04mg/kg/d based on LBW). Told parents to increase to 13u if no notable effects from 12u.  She still needs to see Allergy immunology and children with leptin deficiency will frequently have associated immunodeficiency such as decreased CD4 counts and decreased T cell response.   ENT team working on arranging a possible at home sleep study since she did not tolerate it at the sleep lab. She has delayed milestones and is starting OT tomorrow and awaiting PT appointment. She has not seen nutritionist since prior to starting recombinant leptin in Nov 2024. As Chilbo, RD is no longer available, will have them schedule with one of our nutritionists for follow up and continued support.  Metreleptin will continue to be ordered from [employer]. [facility] [place]eds pharmacy to provide sterile water for medication dilution. The other supplies (23G syringe needles, 31G insulin syringes, alcohol swabs) are covered by insurance and to be [facility]. Parents report only BD Veo Insulin syringes do not hurt with injections - will request MARVIN from now on as the ones they are usually dispensed by the pharmacy are causing her pain at the injection sites.  She is clinically and biochemically euthyroid on levothyroxine 25mcg. TFTs to be repeated every 3-4month..  Plan: Labs today: leptin, CMP, TFTs Follow up with Dr Beasley on 4/21/25 330pm Needs to see Allergy/Immunology Needs regular follow up with nutrition / PCP  Continue levothyroxine 25mcg daily  Increase metreleptin SQ to 12units (insulin syr) / 0.6mg daily). Last 60d refill provided 3/17 - next refill due week of 5/17 They were instructed to call us with questions or concerns.

## 2025-04-16 NOTE — CONSULT LETTER
[Dear  ___] : Dear  [unfilled], [Courtesy Letter:] : I had the pleasure of seeing your patient, [unfilled], in my office today. [Please see my note below.] : Please see my note below. [Consult Closing:] : Thank you very much for allowing me to participate in the care of this patient.  If you have any questions, please do not hesitate to contact me. [Sincerely,] : Sincerely, [FreeTextEntry3] : Savanna Trujillo MD Pediatric Endocrinology Fellow, PGY5 [facility] Pediatric Endocrinology [facility]  Kerline Beasley MD

## 2025-04-16 NOTE — HISTORY OF PRESENT ILLNESS
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 Treatment with metreleptin was started with FDA and IRB approval under compassionate use. She was diagnosed with hypothyroidism in [place] and in June 2024 we have lowered her levothyroxine dose to 25mcg daily due to suppressed TSH.   Labs Jan 2025: TFTs wnl, leptin 3.2ng/mL, lead normal, CBC low MCV and high RDW. Lipids: TG 142mg/dL, Chol 107, HDL 22, LDL 60.(non fasting)  CMP with sl elevated calcium 11mg/dL.  She was last seen 3/17 for follow-up regarding metreleptin therapy. Receiving 10u metreleptin without missed doses and we increased her dose to 11u (0.55mg daily) due to weight gain of 1.2kg since last visit.  She returns for follow up. They are injecting 11u of metreleptin (0.55mg) in her abdomen, arm or thighs. Mom thinks she cries for food more than usual but mostly when she sees it such as cookies or chips. They give it to her when she is asking. Dad doesnt think she is asking for food more than before. He says she is only asking for the food when she is seeing someone eating it, showing her curiousity. Both parents report when she is playing toys she is not asking for food and seems happy. No side effects from injections.  Parents report only BD Veo Insulin syringes do not hurt with injections - other insulin syringes cause pain at the site. Some nights she is waking up every hour waking up asking for milk. Still scooting around with her hips (not crawling). They think she isn't putting weight on left leg. Starting OT at [place] tomorrow. Mom was called by [facility] PT and waiting for appointment. She is using more words now - saying "this" a lot. Has not seen immunologist yet. [facility] is now here to help them at home. They want to go to [facility] in the summer for 2 months She takes 25mcg levothyroxine daily with no missed doses.   Ayzal has an 10 year old sister who is healthy.

## 2025-04-16 NOTE — CONSULT LETTER
[Dear  ___] : Dear  [unfilled], [Courtesy Letter:] : I had the pleasure of seeing your patient, [unfilled], in my office today. [Please see my note below.] : Please see my note below. [Consult Closing:] : Thank you very much for allowing me to participate in the care of this patient.  If you have any questions, please do not hesitate to contact me. [Sincerely,] : Sincerely, [FreeTextEntry3] : Savanna Trujillo MD Pediatric Endocrinology Fellow, PGY5 [facility] Pediatric Endocrinology [facility]  Kerline Beasley MD

## 2025-04-16 NOTE — PHYSICAL EXAM
[Healthy Appearing] : healthy appearing [Well Nourished] : well nourished [Interactive] : interactive [Obese] : obese [Normal Appearance] : normal appearance [Well formed] : well formed [Normally Set] : normally set [Upper Airway Sounds] : transmitted upper airway sounds [Abdomen Soft] : soft [Abdomen Tenderness] : non-tender [] : no hepatosplenomegaly [Normal] : normal [Goiter] : no goiter [de-identified] : morbidly obese, smiling [de-identified] : dry eczematous skin over B/L cheeks; dry skin over forearms around the bilateral wrists, rough flaky patches; a few dry No erythema or hives. [de-identified] : deferred

## 2025-04-16 NOTE — PHYSICAL EXAM
[Healthy Appearing] : healthy appearing [Well Nourished] : well nourished [Interactive] : interactive [Obese] : obese [Normal Appearance] : normal appearance [Well formed] : well formed [Normally Set] : normally set [Upper Airway Sounds] : transmitted upper airway sounds [Abdomen Soft] : soft [Abdomen Tenderness] : non-tender [] : no hepatosplenomegaly [Normal] : normal [Goiter] : no goiter [de-identified] : morbidly obese, smiling [de-identified] : dry eczematous skin over B/L cheeks; dry skin over forearms around the bilateral wrists, rough flaky patches; a few dry No erythema or hives. [de-identified] : deferred

## 2025-04-16 NOTE — HISTORY OF PRESENT ILLNESS
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 Treatment with metreleptin was started with FDA and IRB approval under compassionate use. She was diagnosed with hypothyroidism in [place] and in June 2024 we have lowered her levothyroxine dose to 25mcg daily due to suppressed TSH.   Labs Jan 2025: TFTs wnl, leptin 3.2ng/mL, lead normal, CBC low MCV and high RDW. Lipids: TG 142mg/dL, Chol 107, HDL 22, LDL 60.(non fasting)  CMP with sl elevated calcium 11mg/dL.  She was last seen 3/17 for follow-up regarding metreleptin therapy. Receiving 10u metreleptin without missed doses and we increased her dose to 11u (0.55mg daily) due to weight gain of 1.2kg since last visit.  She returns for follow up. They are injecting 11u of metreleptin (0.55mg) in her abdomen, arm or thighs. Mom thinks she cries for food more than usual but mostly when she sees it such as cookies or chips. They give it to her when she is asking. Dad doesnt think she is asking for food more than before. He says she is only asking for the food when she is seeing someone eating it, showing her curiousity. Both parents report when she is playing toys she is not asking for food and seems happy. No side effects from injections.  Parents report only BD Veo Insulin syringes do not hurt with injections - other insulin syringes cause pain at the site. Some nights she is waking up every hour waking up asking for milk. Still scooting around with her hips (not crawling). They think she isn't putting weight on left leg. Starting OT at [place] tomorrow. Mom was called by [facility] PT and waiting for appointment. She is using more words now - saying "this" a lot. Has not seen immunologist yet. [facility] [place] them at [place]. They want to go to [facility] in the summer for 2 months She takes 25mcg levothyroxine daily with no missed doses.   Ayzal has an 10 year old sister who is healthy.

## 2025-04-16 NOTE — HISTORY OF PRESENT ILLNESS
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 Treatment with metreleptin was started with FDA and IRB approval under compassionate use. She was diagnosed with hypothyroidism in [place] and in June 2024 we have lowered her levothyroxine dose to 25mcg daily due to suppressed TSH.   Labs Jan 2025: TFTs wnl, leptin 3.2ng/mL, lead normal, CBC low MCV and high RDW. Lipids: TG 142mg/dL, Chol 107, HDL 22, LDL 60.(non fasting)  CMP with sl elevated calcium 11mg/dL.  She was last seen 3/17 for follow-up regarding metreleptin therapy. Receiving 10u metreleptin without missed doses and we increased her dose to 11u (0.55mg daily) due to weight gain of 1.2kg since last visit.  She returns for follow up. They are injecting 11u of metreleptin (0.55mg) in her abdomen, arm or thighs. Mom thinks she cries for food more than usual but mostly when she sees it such as cookies or chips. They give it to her when she is asking. Dad doesnt think she is asking for food more than before. He says she is only asking for the food when she is seeing someone eating it, showing her curiousity. Both parents report when she is playing toys she is not asking for food and seems happy. No side effects from injections.  Parents report only BD Veo Insulin syringes do not hurt with injections - other insulin syringes cause pain at the site. Some nights she is waking up every hour waking up asking for milk. Still scooting around with her hips (not crawling). They think she isn't putting weight on left leg. Starting OT at [place] tomorrow. Mom was called by [facility] PT and waiting for appointment. She is using more words now - saying "this" a lot. Has not seen immunologist yet. [facility] [place] help them at home. They want to go to [facility] in the summer for 2 months She takes 25mcg levothyroxine daily with no missed doses.   Ayzal has an 10 year old sister who is healthy.

## 2025-04-16 NOTE — CONSULT LETTER
[Dear  ___] : Dear  [unfilled], [Courtesy Letter:] : I had the pleasure of seeing your patient, [unfilled], in my office today. [Please see my note below.] : Please see my note below. [Consult Closing:] : Thank you very much for allowing me to participate in the care of this patient.  If you have any questions, please do not hesitate to contact me. [Sincerely,] : Sincerely, [FreeTextEntry3] : Savanna Trujillo MD Pediatric Endocrinology Fellow, PGY5 [facility] Pediatric Endocrinology [facility]  Kerline Beasley MD

## 2025-04-21 NOTE — PHYSICAL EXAM
[Healthy Appearing] : healthy appearing [Well Nourished] : well nourished [Interactive] : interactive [Obese] : obese [Normal Appearance] : normal appearance [Well formed] : well formed [Normally Set] : normally set [Goiter] : no goiter [Upper Airway Sounds] : transmitted upper airway sounds [Abdomen Soft] : soft [Abdomen Tenderness] : non-tender [] : no hepatosplenomegaly [Normal] : normal [de-identified] : morbidly obese, smiling [de-identified] : dry eczematous skin over B/L cheeks; dry skin over forearms around the bilateral wrists, rough flaky patches; a few dry No erythema or hives. [FreeTextEntry1] : distended but soft [de-identified] : deferred [de-identified] : hands and feet are puffy but do not appear edematous

## 2025-04-21 NOTE — HISTORY OF PRESENT ILLNESS
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 Treatment with metreleptin was started with FDA and IRB approval under compassionate use. She was diagnosed with hypothyroidism in [place] and in June 2024 we have lowered her levothyroxine dose to 25mcg daily due to suppressed TSH.   She was last seen 4/7  for follow-up regarding metreleptin therapy. Receiving 11u metreleptin without missed doses and we increased her dose to 12u (0.6mg daily) due to weight gain  of 0.9 kg (0.04 mg/kg estimated lbw) She takes 25mcg levothyroxine daily with no missed doses.  Labs with normal CMP, TF's detectable leptin  She still needs to see Allergy immunology and children with leptin deficiency will frequently have associated immunodeficiency such as decreased CD4 counts and decreased T cell response.  Aneta had been sick with URI symptoms and diarrhea.  Was seen in the emergency room.  While there she appeared to have an allergic reaction to something and was sent home with an EpiPen.  Mom showed me the EpiPen today and I explained that it was only for use with a severe allergic reaction with respiratory distress. Mom was very upset in clinic today.  She feels that Aneta has abdominal distress every night.  Dad feels it is gassiness.  Mom is more concerned that there is some other intra-abdominal process going on.  They report that her abdomen becomes distended.  She is also still not sleeping at night and there have been no arrangements yet for the home sleep study. Mom is also concerned as she feels she is very edematous on the hands and the feet. Has not wanted to drink milk or water since she has been ill only Pedialyte.  Is eating food.  Mom reports that she gets hungry although dad states it is only when she sees people eating in front of her but she will now ask for food.   [Premenarchal] : premenarchal

## 2025-04-21 NOTE — CONSULT LETTER
[Dear  ___] : Dear  [unfilled], [Courtesy Letter:] : I had the pleasure of seeing your patient, [unfilled], in my office today. [Please see my note below.] : Please see my note below. [Consult Closing:] : Thank you very much for allowing me to participate in the care of this patient.  If you have any questions, please do not hesitate to contact me. [Sincerely,] : Sincerely, [FreeTextEntry3] : Savanna Trujillo MD Pediatric Endocrinology Fellow, PGY5 [facility] Pediatric Endocrinology [facility]  Kerline Beasley MD

## 2025-05-01 NOTE — PHYSICAL EXAM
[NAD] : in no acute distress [Regular Rate and Rhythm] : regular rate and rhythm [Murmur] : no murmur [Soft] : soft  [Obese] : obese [Distended] : non distended [Tender] : non tender [Rebound] : no rebound tenderness [No HSM] : no hepatosplenomegaly appreciated [Rash] : no rash

## 2025-05-01 NOTE — HISTORY OF PRESENT ILLNESS
[de-identified] : Aneta is seen with her parents today for complaints of abdominal discomfort  I had met Aneta last summer for obesity. Found to have congenital leptin deficiency and hypothyroidism. Both being treated with replacement therapy. BMI down from 40 to 33. Has sleep apnea, noisy breathing, being seen by Dr. Rodriguez of ENT. Recently, noted to have intermittent abdominal discomfort. No vomiting. Stools 1-2 per day. Occasionally stools are firm and Miralax is given with response.

## 2025-05-01 NOTE — ASSESSMENT
[Educated Patient & Family about Diagnosis] : educated the patient and family about the diagnosis [FreeTextEntry1] : The most likely etiology here is aerophagia due to the ENT issues. There are no red flag signs and PE today includes a benign abdominal exam. I renewed the MiraLAX which is being used prn and started a trial of simethicone. Aneta will follow up with ENT and I sent a request for them to be in touch for a follow up visit. Dr. Beasley has been monitoring liver chemistries, and I reminded the family to go to the lab today. Should there be persistence or new GI symptoms. I would be happy to see Aneta back in consultation.

## 2025-05-02 NOTE — ASSESSMENT
[FreeTextEntry1] : 22 month old female with hypothyroidism, leptin deficiency, morbid obesity snoring, and recurrent urticaria, presents for evaluation of recent allergic reaction.  Recent episode of rash/flushing in ER during saline infusion: - skin testing was negative to normal saline and chlorhexidine that was used prior to IV placement HISTORY is consistent with recurrent urticaria, ~4 times a month, mother doesnt have photos.  Erythema during IV infusion was most likely due to exacerbation of underlying urticaria in the setting of viral illness. Relatively cold (room temperature)  IVF can precipitate flushing/urticaria especially in the setting of viral illness and underlying intermittent urticaria The natural history of chronic urticaria, as well as association with thyroid disease and possible triggers, including stress, physical factors, NSAIDs, were discussed. - The use of oral antihistamines was recommended and the risks, benefits, and alternatives of use were discussed including counseling about the possible sedative properties of medications.   = basal tryptase level to be drawn with the next lab work; will also get chronic urticaria autoantibodies. If positive, they will support diagnosis of chronic urticaria   The natural history of  intermittent urticaria discussed and laboratory testing was sent as described below. As her symptoms are mild and intermittent, recommend using antihistamines . Start it  at the onset of hives and continue for 2-3 days or longer if necessary. Return, if symptoms become more severe or persistent.  - trial of cetirizine 2.5 mg po qHs PRN   NON ALLERGIC RHINITIS and SNORING Upon review of skin/IgE testing, there is no evidence of IgE mediated environmental allergic triggers at this time   - trial of nasal fluticasone as ordered and PRN cetirizine  - Proper technique for using nasal sprays was reviewed with the mother

## 2025-05-02 NOTE — HISTORY OF PRESENT ILLNESS
[de-identified] : NEIL ALCANTARA is a 22 month  old female with hypothyroidism and leptin deficiency, presents for evaluation of recent allergic reaction.  Presented to ER 4/19/25 with congestion and diarrhea for 4 days. No fever. No RVP performed.  Mother described diffuse redness in his face, body and extremities, she was uncomfortable, but not scratching.   Timeline:  - 4:05 - IV placed - 4:20- rash to face, chest and upper extremities, treated with Benadryl, Epinephrine and Dexamethasone. His symptoms resolved.   - she never had a  prior surgery  - Prior ED visit 10/2024 for SOB.   - She had prior episode of itchy erythematous rash: --- after metreleptin SQ daily (started 11/22/24) ---  she does get red and itchy sometimes after eating "oily foods", for example pizza but she tolerates other types of dairy and eats unrestricted diet. Sometimes these episodes occur without connection to meals. These episodes average 1/month ---  she gets red cheeks frequently - mom thinks she had eczema - she was prescribed albuterol for SOB, but it doesnt help - she snores, followed by Dr Rodriguez, Did not tolerate sleep study  Environmental history: - no pets - no carpeting  Family history:  - no allergies

## 2025-05-02 NOTE — IMPRESSION
[Allergy Testing Dust Mite] : dust mites [Allergy Testing Mixed Feathers] : feathers [Allergy Testing Cockroach] : cockroach [Allergy Testing Dog] : dog [Allergy Testing Cat] : cat [Allergy Testing Trees] : trees [Allergy Testing Weeds] : weeds [Allergy Testing Grasses] : grasses [] : Chlorhexidine [________] : [unfilled]

## 2025-05-02 NOTE — PHYSICAL EXAM
[Alert] : alert [No Acute Distress] : no acute distress [Sclera Not Icteric] : sclera not icteric [Normal Lips/Tongue] : the lips and tongue were normal [No Thrush] : no thrush [No Cyanosis] : no cyanosis [Dermatographism] : no dermatographism [de-identified] : morbidly obese

## 2025-05-02 NOTE — CONSULT LETTER
[Dear  ___] : Dear  [unfilled], [Consult Letter:] : I had the pleasure of evaluating your patient, [unfilled]. [Please see my note below.] : Please see my note below. [Consult Closing:] : Thank you very much for allowing me to participate in the care of this patient.  If you have any questions, please do not hesitate to contact me. [Sincerely,] : Sincerely, [DrMono  ___] : Dr. MIJARES [FreeTextEntry3] : MD JINA Lim, [facility] Pediatric Allergy, Asthma and Clinical Immunology Associate Professor of Medicine and Pediatrics at   [facility], Adult Allergy and Immunology   [facility]   Division of Allergy, Asthma and Immunology   [address] [place] [address], [place] [address]   Phone [phone]  Fax [phone]

## 2025-05-23 NOTE — PHYSICAL EXAM
[Healthy Appearing] : healthy appearing [Well Nourished] : well nourished [Interactive] : interactive [Obese] : obese [Normal Appearance] : normal appearance [Well formed] : well formed [Normally Set] : normally set [Upper Airway Sounds] : transmitted upper airway sounds [Abdomen Soft] : soft [Abdomen Tenderness] : non-tender [] : no hepatosplenomegaly [Normal] : normal

## 2025-05-27 NOTE — REVIEW OF SYSTEMS
[Fever] : no fever [Irritable] : no irritability [Inconsolable] : consolable [Difficulty with Sleep] : difficulty with sleep [Nasal Congestion] : nasal congestion [Snoring] : snoring [Rash] : rash [Itching] : itching [Hives] : hives [Negative] : Genitourinary

## 2025-05-27 NOTE — HISTORY OF PRESENT ILLNESS
[de-identified] : Leptin Deficiency, Complex Care [FreeTextEntry6] : Aneta is a 23 mo F with AR congenital leptin deficiency, developmental delay, sleep disordered breathing, hypothyroidism, dyslipidemia and other chronic complex conditions here for f/u.   Interim events:   - had allergic reaction to normal saline was taken to the Ed was given dexamethasone and epinephrine  -seen by allergy and immunology discussed that she likely has eczema and chronic recurrent urticaria started on cetirizine however mother did not receive it  - seen by a GI likely has aerophagia started on miralax PRN   - seen at the equipment clinic yesterday ordered for a wheelchair adaptive stroller and adaptive car seat was told that she should see neurology because there may be something neurological wrong with why she cannot walk or crawl yet- mom requesting opinion  - has not started EI or physical therapy in terms of physical therapy mom provided it very minimal times that she's able to do it which is preventing her from starting PT    #Leptin Deficiency: Increase of leptin to 18 units in [facility] has been doing it without any issues. Her diet consists of half of a roti for breakfast, chicken and vegetables for lunch and dinner. She will have one banana every day she does not get any snacks she has 2% water half water half milk at night no juice.    #Sleeping problems: Describes that Aneta's sleeping is getting worse. She sleeps sitting up but will wake herself up with what mom describes as apneic events of her not being able to breathe. Mom is also unable to sleep throughout the night because she's watching her. With this she is very cranky throughout the day and is unable to do some of the things that mom would like for her to do such as PT exercises.    #Rashes: Mom is concerned about the rash is that she has. She is itching a lot. Rashes are on her arms and on her back. Appeared to be hive like. Other areas she has dry patchy skin consistent with her eczema. Has not given her any medication or tried any creams.    #Social Stress: Father lost his job a month ago and they do not have any source of income coming in. Mom is interested in applying for SNAP and SSI. Mother is taking care of both of her daughters and her both of her in laws. Describing significant psychological and psychosocial distress. We have applied for CDPAP multiple times and have been denied. It is very difficult for her to take care of her Aneta by herself.   Specialists: endocrinology, allergy and immunology, equipment clinic, PM&R, ENT, pulmonology

## 2025-05-27 NOTE — PHYSICAL EXAM
[NL] : moves all extremities x4, warm, well perfused x4 [de-identified] : urticaria on trunk and arms, eczematous patches on arms

## 2025-05-27 NOTE — HISTORY OF PRESENT ILLNESS
[de-identified] : Leptin Deficiency, Complex Care [FreeTextEntry6] : Aneta is a 23 mo F with AR congenital leptin deficiency, developmental delay, sleep disordered breathing, hypothyroidism, dyslipidemia and other chronic complex conditions here for f/u.   Interim events:   - had allergic reaction to normal saline was taken to the Ed was given dexamethasone and epinephrine  -seen by allergy and immunology discussed that she likely has eczema and chronic recurrent urticaria started on cetirizine however mother did not receive it  - seen by a GI likely has aerophagia started on miralax PRN   - seen at the equipment clinic yesterday ordered for a wheelchair adaptive stroller and adaptive car seat was told that she should see neurology because there may be something neurological wrong with why she cannot walk or crawl yet- mom requesting opinion  - has not started EI or physical therapy in terms of physical therapy mom provided it very minimal times that she's able to do it which is preventing her from starting PT    #Leptin Deficiency: Increase of leptin to 18 units in [facility] has been doing it without any issues. Her diet consists of half of a roti for breakfast, chicken and vegetables for lunch and dinner. She will have one banana every day she does not get any snacks she has 2% water half water half milk at night no juice.    #Sleeping problems: Describes that Aneta's sleeping is getting worse. She sleeps sitting up but will wake herself up with what mom describes as apneic events of her not being able to breathe. Mom is also unable to sleep throughout the night because she's watching her. With this she is very cranky throughout the day and is unable to do some of the things that mom would like for her to do such as PT exercises.    #Rashes: Mom is concerned about the rash is that she has. She is itching a lot. Rashes are on her arms and on her back. Appeared to be hive like. Other areas she has dry patchy skin consistent with her eczema. Has not given her any medication or tried any creams.    #Social Stress: Father lost his job a month ago and they do not have any source of income coming in. Mom is interested in applying for SNAP and SSI. Mother is taking care of both of her daughters and her both of her in laws. Describing significant psychological and psychosocial distress. We have applied for CDPAP multiple times and have been denied. It is very difficult for her to take care of her Aneta by herself.   Specialists: endocrinology, allergy and immunology, equipment clinic, PM&R, ENT, pulmonology

## 2025-05-27 NOTE — PHYSICAL EXAM
[NL] : moves all extremities x4, warm, well perfused x4 [de-identified] : urticaria on trunk and arms, eczematous patches on arms

## 2025-05-27 NOTE — ASSESSMENT
[TextEntry] : #Dental: multiple teeth, parents not brushing. Fluoride varnish applied (LOT # [id number], Ex 1/25/26) -Dental appt needed - [facility] to help   F/U 1 month for 2 year old HÉCTOR

## 2025-05-27 NOTE — ASSESSMENT
[TextEntry] : #Dental: multiple teeth, parents not brushing. Fluoride varnish applied (LOT # [id number], Ex 1/25/26) -Dental appt needed - [facility] to help   F/U 1 month for 2 year old HÉCTOR

## 2025-06-06 NOTE — HISTORY OF PRESENT ILLNESS
[de-identified] : cough and congestion [FreeTextEntry6] : Aneta is a 23mo w/ PMH of chronic cough, congenital leptin deficiency (on metreleptin), hypothyroidism, sleep disordered breathing, developmental delays, chronic constipation, eczema, and elevated BMI, presenting for acute visit for cough, congestion, runny nose, and ear tugging for the last 2-3 days. Mom states patient was in her usual state of health until Tuesday (6/3) when she began having congestion, runny nose, and cough which has been disrupting her sleep. Mom has not been suctioning, but does have a bulb suction at home, but no normal saline drops. She has been doing nebulized albuterol & saline twice daily per her airway clearance regimen, but does not think it is helping enough. She has not had any fevers at home nor has she felt warm enough to prompt mom to check. She has been tolerating her usual amount of oral intake of solids and liquids, and has had a normal amount of wet diapers for her (3-4 wet diaper yesterday, 2 wet diapers so far today). She has a slightly increase in stooling over the last 2 days (usually stools 1-2 times daily, now is stooling 2-4 times daily) and her stools appear looser, but no signs of blood and no large volume episodes of diarrhea. Mom states she has had one episode of post-tussive emesis, but no vomiting otherwise. No rashes. No recent travel history and no known sick contacts.

## 2025-06-06 NOTE — REVIEW OF SYSTEMS
[Irritable] : irritability [Difficulty with Sleep] : difficulty with sleep [Nasal Discharge] : nasal discharge [Nasal Congestion] : nasal congestion [Snoring] : snoring [Mouth Breathing] : mouth breathing [Cough] : cough [Congestion] : congestion [Negative] : Genitourinary [Fever] : no fever [Inconsolable] : consolable [Fussy] : not fussy [Eye Discharge] : no eye discharge [Eye Redness] : no eye redness [Increased Lacrimation] : no increased lacrimation [Itchy Eyes] : no itchy eyes [Tachypnea] : not tachypneic [Wheezing] : no wheezing [Appetite Changes] : no appetite changes [Intolerance to feeds] : tolerance to feeds [Vomiting] : no vomiting [Diarrhea] : no diarrhea [Constipation] : no constipation

## 2025-06-06 NOTE — HISTORY OF PRESENT ILLNESS
[de-identified] : cough and congestion [FreeTextEntry6] : Aneta is a 23mo w/ PMH of chronic cough, congenital leptin deficiency (on metreleptin), hypothyroidism, sleep disordered breathing, developmental delays, chronic constipation, eczema, and elevated BMI, presenting for acute visit for cough, congestion, runny nose, and ear tugging for the last 2-3 days. Mom states patient was in her usual state of health until Tuesday (6/3) when she began having congestion, runny nose, and cough which has been disrupting her sleep. Mom has not been suctioning, but does have a bulb suction at home, but no normal saline drops. She has been doing nebulized albuterol & saline twice daily per her airway clearance regimen, but does not think it is helping enough. She has not had any fevers at home nor has she felt warm enough to prompt mom to check. She has been tolerating her usual amount of oral intake of solids and liquids, and has had a normal amount of wet diapers for her (3-4 wet diaper yesterday, 2 wet diapers so far today). She has a slightly increase in stooling over the last 2 days (usually stools 1-2 times daily, now is stooling 2-4 times daily) and her stools appear looser, but no signs of blood and no large volume episodes of diarrhea. Mom states she has had one episode of post-tussive emesis, but no vomiting otherwise. No rashes. No recent travel history and no known sick contacts.

## 2025-06-06 NOTE — PHYSICAL EXAM
[Pink Nasal Mucosa] : pink nasal mucosa [Mucoid Discharge] : mucoid discharge [NL] : warm, clear [Nasal Flaring] : no nasal flaring [FreeTextEntry3] : +erythema of the bilateral TMs and ear canals without signs of pus/discharge behind the TMs or ear canals bilaterally

## 2025-06-06 NOTE — DISCUSSION/SUMMARY
[FreeTextEntry1] : Aneta is a 23mo w/ PMH of chronic cough, congenital leptin deficiency (on metreleptin), hypothyroidism, sleep disordered breathing, developmental delays, chronic constipation, eczema, and elevated BMI, presenting for acute visit for cough, congestion, runny nose, and ear tugging for the last 2-3 days, found to have clear rhinorrhea and bilateral erythematous TMs and ear canals without signs of pus or discharge, consistent with viral upper respiratory tract infection. Counseled mother on management of congestion by used of sodium chloride drops with suctioning of the nose, especially prior to meals and sleep to help patient tolerate PO easier and sleep more comfortably, respectively. Additionally, reviewed use of tylenol or motrin as needed for fevers (>100.4F), which the patient has not had but could develop in the setting of a viral infection. Given examination findings of ears, recommended no antibiotics at this time and to continue to monitor, including provided return precautions for mom if fevers develop and last more than 5 days. Additionally, patient appears well hydrated on exam and has been tolerating her normal amount of oral intake, but did provide return precautions concerning signs of dehydration (including <1 wet diaper every 8 hours or <3 wet diapers in 24 hours). Finally, reviewed signs of respiratory distress (retractions, tachypnea, increased work of breathing), and counseled to contact our clinic or report to the nearest pediatric ED for evaluation and management should concerns with the patient's breathing, hydration, or persistence of fevers >5 days arise. We will plan to see the patient in 1 month for her 2 year WCC, or sooner as needed.

## 2025-06-10 NOTE — ASSESSMENT
[FreeTextEntry1] : NEIL is a 23 month old girl presenting for sleep disordered breathing  leptin deficiency  Sleep disordered breathing  - Did not tolerate sleep study, plans for Virtuox and possible CPAP titration  - Discussed tracheostomy tube as an option for treating obstructive sleep apnea - consider DISE possible adjuvant procedure BMT ABR, unable to pass scope on right today  eustachian tube dysfunction **note no NBHS** - URI conservative measures - consider BMT ABR  consider DISE if undergoing anesthesia but overall poor surgical candidate recommend sleep study and CPAP trial  Drug Induced Sleep Endoscopy, Direct Laryngoscopy and Bronchoscopy and Possible Adjuvant Procedures: The risks, benefits and alternatives of drug induced sleep endoscopy, direct laryngoscopy and bronchoscopy and possible adjuvant sleep procedures (primary or revision tonsillectomy and/or adenoidectomy, tongue base reduction, lingual tonsillectomy, epiglottoplexy, palatal suspension, palatopharyngoplasty, inferior turbinate reduction, supraglottoplasty) were discussed. Risks including but not limited to pain, bleeding, swelling, infection, scarring damage to lips, teeth, gums, parts of the oral cavity, oropharynx and airway, risk for further procedures, and risk of anesthesia (which will be discussed with you by the anesthesiologist).  Risks of postoperative need for oxygen, positive pressure ventilation or intubation and possible observation and/or hospital admission were discussed.  Risk of possible persistent obstructive sleep apnea discussed. Benefits include the diagnosis or surveillance of an underlying condition and treatment of an underlying condition. Alternatives include observation, and other diagnostic studies. During observation, if there is an underlying condition there is a risk that it could progress. Photodocumentation including pictures and video with or without sound may be taken, and effort will be made to maintain respect for privacy and confidentiality.

## 2025-06-10 NOTE — HISTORY OF PRESENT ILLNESS
[de-identified] : Today I had the pleasure of seeing NEIL ALCANTARA for follow up for sleep disordered breathing History was obtained from patient, mother and chart. leptin deficiency  [de-identified] : cough congestion pulling at ears

## 2025-06-10 NOTE — PHYSICAL EXAM
[Effusion] : effusion [Exposed Vessel] : left anterior vessel not exposed [2+] : 2+ [Increased Work of Breathing] : no increased work of breathing with use of accessory muscles and retractions [Normal Gait and Station] : normal gait and station [Normal muscle strength, symmetry and tone of facial, head and neck musculature] : normal muscle strength, symmetry and tone of facial, head and neck musculature [Normal] : no cervical lymphadenopathy [de-identified] : serous [de-identified] : serous [de-identified] : endophytic

## 2025-06-10 NOTE — CONSULT LETTER
[Dear  ___] : Dear  [unfilled], [Courtesy Letter:] : I had the pleasure of seeing your patient, [unfilled], in my office today. [Please see my note below.] : Please see my note below. [Consult Closing:] : Thank you very much for allowing me to participate in the care of this patient.  If you have any questions, please do not hesitate to contact me. [Sincerely,] : Sincerely, [FreeTextEntry2] : Dr. Liliana Coronado [FreeTextEntry3] : Maame Rodriguez MD Pediatric Otolaryngology / Head [place] [address] Tel [phone] Fax [phone]

## 2025-06-12 NOTE — HISTORY OF PRESENT ILLNESS
[de-identified] : NEIL ALCANTARA is a 23 month old female with sleep apnea, hypothyroidism and leptin deficiency, recurrent urticaria presenting for follow up.   URTICARIA - patient still having itchiness with spots around forehead, back, and hands despite taking 3mL zyrtec daily. However now her skin is very dry - using leno at this time for skin moisture  SLEEP APNEA - she did not tolerate sleep study per ENT - plan is for virtuox and possible CPAP titration. scope was unable to be passed on R     ____________________________________ PREVIOUS HISTORY: Presented to ER 4/19/25 with congestion and diarrhea for 4 days. No fever. No RVP performed. Mother described diffuse redness in his face, body and extremities, she was uncomfortable, but not scratching.  Timeline: - 4:05 - IV placed - 4:20- rash to face, chest and upper extremities, treated with Benadryl, Epinephrine and Dexamethasone. His symptoms resolved.  - she never had a prior surgery - Prior ED visit 10/2024 for SOB.  - She had prior episode of itchy erythematous rash: --- after metreleptin SQ daily (started 11/22/24) --- she does get red and itchy sometimes after eating "oily foods", for example pizza but she tolerates other types of dairy and eats unrestricted diet. Sometimes these episodes occur without connection to meals. These episodes average 1/month --- she gets red cheeks frequently - mom thinks she had eczema - she was prescribed albuterol for SOB, but it doesnt help - she snores, followed by Dr Rodriguez, Did not tolerate sleep study  Environmental history: - no pets - no carpeting  Family history: - no allergies.

## 2025-06-12 NOTE — HISTORY OF PRESENT ILLNESS
[de-identified] : NEIL ALCANTARA is a 23 month old female with sleep apnea, hypothyroidism and leptin deficiency, recurrent urticaria presenting for follow up.   URTICARIA - patient still having itchiness with spots around forehead, back, and hands despite taking 3mL zyrtec daily. However now her skin is very dry - using leno at this time for skin moisture  SLEEP APNEA - she did not tolerate sleep study per ENT - plan is for virtuox and possible CPAP titration. scope was unable to be passed on R     ____________________________________ PREVIOUS HISTORY: Presented to ER 4/19/25 with congestion and diarrhea for 4 days. No fever. No RVP performed. Mother described diffuse redness in his face, body and extremities, she was uncomfortable, but not scratching.  Timeline: - 4:05 - IV placed - 4:20- rash to face, chest and upper extremities, treated with Benadryl, Epinephrine and Dexamethasone. His symptoms resolved.  - she never had a prior surgery - Prior ED visit 10/2024 for SOB.  - She had prior episode of itchy erythematous rash: --- after metreleptin SQ daily (started 11/22/24) --- she does get red and itchy sometimes after eating "oily foods", for example pizza but she tolerates other types of dairy and eats unrestricted diet. Sometimes these episodes occur without connection to meals. These episodes average 1/month --- she gets red cheeks frequently - mom thinks she had eczema - she was prescribed albuterol for SOB, but it doesnt help - she snores, followed by Dr Rodriguez, Did not tolerate sleep study  Environmental history: - no pets - no carpeting  Family history: - no allergies.

## 2025-06-12 NOTE — REASON FOR VISIT
[Routine Follow-Up] : a routine follow-up visit for [Mother] : mother [Allergy Evaluation/ Skin Testing] : allergy evaluation and or skin testing [Immune Evaluation] : immune evaluation

## 2025-06-13 NOTE — HISTORY OF PRESENT ILLNESS
[Premenarchal] : premenarchal [Constipation] : no constipation [FreeTextEntry2] : Aneta is a 23mo old of [place] descent with PMH hypothyroidism and AR congenital leptin deficiency (c.398del, p.([id number]*15) now on metreleptin SQ daily (started 11/22/24). Treatment with metreleptin was started with FDA and IRB approval under compassionate use. She was diagnosed with hypothyroidism in [place] and in June 2024 we have lowered her levothyroxine dose to 25mcg daily due to suppressed TSH.   Labs April 2025: TFTs wnl, leptin 0.8ng/mL, CMP ALT 84 -> 45; neg celiac  She was last seen 5/23 for follow-up regarding metreleptin therapy. Receiving 18u metreleptin without missed doses since last increase 5/2/25 (0.06mg/kg/d based on LBW).  She returns for follow up. Mom is injecting 18u of metreleptin (0.9mg) in her abdomen, arm or thighs at 11pm. Regarding food intake and asking for food: she is eating regularly and not asking for food in excess. Eats small amounts of food at a time. Asks for food when she sees it and mom has. No side effects from injections. Mom was told that the sleep study will be arranged for after she turns 2 years old. Regarding development: Speech is still delayed. Says reese sawyer, this, yes or no. Less than 10 words total. She understands what mom tells her. Mom notices she has been trying to stand up which is new. She sits up by herself and is stable. Mom has not heard back about physical therapy and waiting on pulm and neuro appointments. Mom missed nutrition appt on 5/28 - reports she had a fever. She was not confident with injection technique and we have arranged one of our nurses Betsy to watch her do an injection however she did not come for this visit so this was not done. She takes 25mcg levothyroxine daily with no missed doses. Mom gives it via syringe in the morning.   Aneta has an 10 year old sister who is healthy.

## 2025-06-13 NOTE — HISTORY OF PRESENT ILLNESS
[Premenarchal] : premenarchal [Constipation] : no constipation [FreeTextEntry2] : Aneta is a 23mo old of [place] descent with PMH hypothyroidism and AR congenital leptin deficiency (c.398del, p.([id number]*15) now on metreleptin SQ daily (started 11/22/24). Treatment with metreleptin was started with FDA and IRB approval under compassionate use. She was diagnosed with hypothyroidism in [place] and in June 2024 we have lowered her levothyroxine dose to 25mcg daily due to suppressed TSH.   Labs April 2025: TFTs wnl, leptin 0.8ng/mL, CMP ALT 84 -> 45; neg celiac  She was last seen 5/23 for follow-up regarding metreleptin therapy. Receiving 18u metreleptin without missed doses since last increase 5/2/25 (0.06mg/kg/d based on LBW).  She returns for follow up. Mom is injecting 18u of metreleptin (0.9mg) in her abdomen, arm or thighs at 11pm. Regarding food intake and asking for food: she is eating regularly and not asking for food in excess. Eats small amounts of food at a time. Asks for food when she sees it and mom has. No side effects from injections. Mom was told that the sleep study will be arranged for after she turns 2 years old. Regarding development: Speech is still delayed. Says reese sawyer, this, yes or no. Less than 10 words total. She understands what mom tells her. Mom notices she has been trying to stand up which is new. She sits up by herself and is stable. Mom has not heard back about physical therapy and waiting on pulm and neuro appointments. Mom missed nutrition appt on 5/28 - reports she had a fever. She was not confident with injection technique and we have arranged one of our nurses Betsy to watch her do an injection however she did not come for this visit so this was not done. She takes 25mcg levothyroxine daily with no missed doses. Mom gives it via syringe in the morning.   Aneta has an 10 year old sister who is healthy.

## 2025-06-13 NOTE — PHYSICAL EXAM
[Healthy Appearing] : healthy appearing [Well Nourished] : well nourished [Interactive] : interactive [Obese] : obese [Normal Appearance] : normal appearance [Well formed] : well formed [Normally Set] : normally set [Upper Airway Sounds] : transmitted upper airway sounds [Abdomen Soft] : soft [Abdomen Tenderness] : non-tender [] : no hepatosplenomegaly [Normal] : normal [Goiter] : no goiter [de-identified] : morbidly obese, smiling [de-identified] : red eczematous rash over cheeks. dry skin over forearms around the bilateral wrists with a couple of excoriated spots.. [de-identified] : deferred

## 2025-06-13 NOTE — ASSESSMENT
[FreeTextEntry1] : Aneta is a 23mo old with PMH hypothyroidism, severe obesity, hypothyroidism and AR congenital leptin deficiency (c.398del, p.([id number]) here for follow up for leptin deficiency on metreleptin 0.9mg SQ daily (0.06mg/kg/d based on LBW assuming 50% body fat) approved under single patient expanded access by FDA and [facility] IRB. She takes levothyroxine 25mcg daily with no missed doses and her TFTs were normal in April 2025.  Since last visit weight has been stable. This is attributed to the increased dosage on 5/2/25 from 12u to 18u daily. She has no significant side effects from metreleptin therapy and is tolerating injections  Work up by immunology is in progress for possible immunodeficiency and so far normal which is reassuring. Awaiting sleep study. Mom also reports waiting on neurology and pulm appointments. Social concerns in the family - SW in primary care following. [facility] is helping the family with appointments and transportation  Metreleptin will continue to be ordered from [email or web address]. [facility] peds pharmacy to provide sterile water for medication dilution. The other supplies (23G syringe needles, 31G insulin syringes, alcohol swabs) are covered by insurance and to be filled through [facility].  She is clinically and biochemically euthyroid on levothyroxine 25mcg. TFTs to be repeated every 3-4months.  Plan: No labs today - next due July or Aug 2025 Follow up with Dr Beasley in 2-3 weeks - will arrange follow up Needs nutrition appt - will arrange for one of our nurses to observe mom mix and administer leptin after that visit Continue levothyroxine 25mcg daily, discussed mixing and administering in yogurt, apple sauce or pudding. To continue Leptin 18u daily (0.9mg SQ daily) - 2 boxes 62 vials provided - Lot [id number] exp 1/2028 They were instructed to call us with questions or concerns. Will touch base with [facility] SW regarding care coordination for PT/neuro/pulm.  Savanna Trujillo MD Pediatric Endocrinology Fellow, PGY5

## 2025-06-13 NOTE — CONSULT LETTER
[Dear  ___] : Dear  [unfilled], [Courtesy Letter:] : I had the pleasure of seeing your patient, [unfilled], in my office today. [Please see my note below.] : Please see my note below. [Consult Closing:] : Thank you very much for allowing me to participate in the care of this patient.  If you have any questions, please do not hesitate to contact me. [Sincerely,] : Sincerely, [FreeTextEntry3] : Kerline Beasley MD

## 2025-06-13 NOTE — ASSESSMENT
[FreeTextEntry1] : Aneta is a 23mo old with PMH hypothyroidism, severe obesity, hypothyroidism and AR congenital leptin deficiency (c.398del, p.([id number]) here for follow up for leptin deficiency on metreleptin 0.9mg SQ daily (0.06mg/kg/d based on LBW assuming 50% body fat) approved under single patient expanded access by FDA and [facility] IRB. She takes levothyroxine 25mcg daily with no missed doses and her TFTs were normal in April 2025.  Since last visit weight has been stable. This is attributed to the increased dosage on 5/2/25 from 12u to 18u daily. She has no significant side effects from metreleptin therapy and is tolerating injections  Work up by immunology is in progress for possible immunodeficiency and so far normal which is reassuring. Awaiting sleep study. Mom also reports waiting on neurology and pulm appointments. Social concerns in the family - SW in primary care following. [facility] is helping the family with appointments and transportation  Metreleptin will continue to be ordered from [employer]. [facility] [place] [facility] peds pharmacy to provide sterile water for medication dilution. The other supplies (23G syringe needles, 31G insulin syringes, alcohol swabs) are covered by insurance and [facility].  She is clinically and biochemically euthyroid on levothyroxine 25mcg. TFTs to be repeated every 3-4months.  Plan: No labs today - next due July or Aug 2025 Follow up with Dr Beasley in 2-3 weeks - will arrange follow up Needs nutrition appt - will arrange for one of our nurses to observe mom mix and administer leptin after that visit Continue levothyroxine 25mcg daily, discussed mixing and administering in yogurt, apple sauce or pudding. To continue Leptin 18u daily (0.9mg SQ daily) - 2 boxes 62 vials provided - Lot [id number] exp 1/2028 They were instructed to call us with questions or concerns. Will touch base with [facility] SW regarding care coordination for PT/neuro/pulm.  Savanna Trujillo MD Pediatric Endocrinology Fellow, PGY5

## 2025-06-23 NOTE — PHYSICAL EXAM
[Well Groomed] : well groomed [Alert] : ~L alert [Active] : active [Normal Breathing Pattern] : normal breathing pattern [No Respiratory Distress] : no respiratory distress [No Nasal Drainage] : no nasal drainage [No Oral Cyanosis] : no oral cyanosis [No Stridor] : no stridor [Absence Of Retractions] : absence of retractions [Symmetric] : symmetric [No Acc Muscle Use] : no accessory muscle use [Equal Breath Sounds] : equal breath sounds bilaterally [No Crackles] : no crackles [No Rhonchi] : no rhonchi [No Wheezing] : no wheezing [Normal Sinus Rhythm] : normal sinus rhythm [No Clubbing] : no clubbing [Capillary Refill < 2 secs] : capillary refill less than two seconds [No Cyanosis] : no cyanosis

## 2025-06-24 NOTE — HISTORY OF PRESENT ILLNESS
[FreeTextEntry1] : Immigrated from [place] May 2024. FT birth. No complications or NICU stay.  Several months of polyphagia leading to obesity concerning for endocrinopathy vs genetic etiology. Genetically confirmed Leptin deficiency and hypothyroidism.  Admitted to [facility] [place]24 with infectious diarrhea- 2/2 astrovirus, salmonella EAEC and EPEC for IV hydration and supportive care. Since resolved.  Brain MRI study attempted during admission 6/3/24, to evaluate central cause of polyphagia- pt did not tolerate sedation due to desaturation with sedation. thought to be due to her obesity.  Follows with endocrinology. Maintained on Synthroid since 8mnths of age.   GI eval: Dr. Jasmine 6/17/24.   Jun 24, 2025 FOLLOW UP: Interval Hx: -Last seen Aug 2024 by Ebony SKINNER, recs: albuterol BID with chest clapping, PSG 10/2024- did not tolerate PSG plan for [employer] and possible CPAP titration per Dr. Pabon, discussed tracheostomy for SILVINA -  - Daily meds: Rescue meds: Albuterol PRN Recent ER visits/hospitalizations: Last oral steroid course: Baseline daytime cough, SOB or wheeze: Baseline nocturnal cough, SOB or wheeze: Exertional cough, SOB or wheeze: Allergic rhinitis symptoms: Flu vaccine [id number]: COVID 19 vaccine: Misc notes:  == FOLLOW UP: Aug 27, 2024 Last seen (June 2024) - Recs: ENT eval, Sleep study, Airway clearance with Albuterol BID with chest clapping. Nebulized saline for increased nasal congestion.    Interval hx: - Genetically confirmed Leptin deficiency and hypothyroidism. Endocrinology is attempting to obtain treatment with Recombinant Leptin.  - Evaluated by ENTs Dr. Post and Dr. Rodriguez for SILVINA sxs: partially obstructive adenoids. May undergo BMT ABR and if so Dr. Rodriguez advises Drug induced sleep endoscopy, and laryngosocpy however Pt is poor surgical candidate. Tx with abx for adenoiditis. Advised PSG and CPAP trial.   - PSG scheduled for Oct 2024.  - maintained on daily Synthroid.  - mother states airway clearance with albuterol and chest clapping was never started.  - intermittent dry cough remains.  - No interval oral steroids. - No interval ER visits/hospitalizations.   Daily meds: synthroid.   Baseline daytime cough, SOB or wheeze: intermittent dry cough Baseline nocturnal cough, SOB or wheeze: infrequent. +loud snoring.  SILVINA sx: YES, PSG Oct 2024.  CHRISTIAN sx: denies  Allergic rhinitis symptoms: denies    Modified Asthma Predictive Index (mAPI): 4 wheezing illnesses AND 1 major criteria Parental asthma: NO atopic dermatitis: NO Aeroallergen sensitization suspected: NO   OR   2 minor criteria Food sensitization: NO Peripheral blood eosinophilia =4%: Wheezing apart from colds:  NO

## 2025-06-24 NOTE — SOCIAL HISTORY
[Parent(s)] : parent(s) [___ Sisters] : [unfilled] sisters [None] : none [Smokers in Household] : there are smokers in the home [de-identified] : upstairs apt of house.  [Bedroom] : not in the bedroom [Living Area] : not in the living area [de-identified] : YES father inside the home.

## 2025-06-24 NOTE — REVIEW OF SYSTEMS
[Nl] : Eyes [Snoring] : snoring [Apnea] : apnea [Nasal Congestion] : nasal congestion [Cough] : cough [Sleep Disturbances] : ~T sleep disturbances [Immunizations are up to date] : Immunizations are up to date [Wheezing] : no wheezing [Pneumonia] : no pneumonia [Spitting Up] : not spitting up [Problems Swallowing] : no problems swallowing [Muscle Weakness] : no muscle weakness [Seizure] : no seizures [Eczema] : no ezcema [FreeTextEntry2] : obese (+leptin deficiency).  [FreeTextEntry4] : followed by ENT, partially obstructed adenoid and adenoiditis tx with cefdinir.

## 2025-06-24 NOTE — REASON FOR VISIT
[Routine Follow-Up] : a routine follow-up visit for [Cough] : cough [Sleep Apnea] : sleep apnea [Mother] : mother [Father] : father [Medical Records] : medical records [FreeTextEntry3] : obesity.

## 2025-07-03 NOTE — HISTORY OF PRESENT ILLNESS
[Parents] : parents [Normal] : Normal [None] : Primary Fluoride Source: None [Playtime 60 min a day] : Playtime 60 min a day [Temper Tantrums] : Temper Tantrums [No] : No cigarette smoke exposure [Water heater temperature set at <120 degrees F] : Water heater temperature set at <120 degrees F [Car seat in back seat] : Car seat in back seat [Smoke Detectors] : Smoke detectors [Carbon Monoxide Detectors] : Carbon monoxide detectors [Up to date] : Up to date [NO] : No [Exposure to electronic nicotine delivery system] : No exposure to electronic nicotine delivery system [At risk for exposure to TB] : Not at risk for exposure to Tuberculosis [FreeTextEntry7] : Seen by ENT- plan is to do a home sleep study now that she is 2 years old. Likely will need CPAP or other mode of ventilation at night. Seen by AI- normal appearing immunologic work up. Seen by endo- on leptin 18 u, weight stable . Missed pulm appt as mother had an appointment of her own that day.  [de-identified] : Hearing- mother concerned that imerzal does not hear very well since she does not speak very much. She says a few words consistently about 10 words total. Mother would also like to see a neurologist at least once to ensure that Ayzal is "ok". [de-identified] : B: 1/2 a roti and egg whites L: lean meat usually small amount of chicken with vegetables D- the same as lunch, no snacks usually; has 8 oz of 1/2 water and 1/2 2% milk at night [FreeTextEntry3] : sleeping 6-7 hours a night now, still sitting up, still with pauses and snoring. Only slept 1 hour last night, which happens once a month. [de-identified] : does not brush her teeth

## 2025-07-03 NOTE — ASSESSMENT
[TextEntry] : #WCV DENTAL: does not brush her teeth- fluoride was applied at last visit; emphasized that she needs to brush her teeth with toothpaste and establish care with a dentist  VACCINES: up to date SCREENINGS:  SDOH- positive, followed by our patient navigator team, [facility] and healthy steps LABS: CBC, Lead-- please obtain at next lab draw   *ANTICIPATORY GUIDANCE: Nutrition: Offer healthy snacks, limit sugary drinks, and encourage self-feeding. Picky eating is common. Safety:Childproof your home thoroughly (locks, fontana, outlet covers).  Supervise closely around water, streets, and stairs. Car seats are still rear-facing ideally, or forward-facing if they've outgrown the limits of their rear-facing seat. Development:Expect tantrums. Encourage language development by talking, reading, and singing.  Focus on positive discipline and setting clear limits.  Parallel play (playing alongside other children without interacting directly) is typical. Sleep:Consistent bedtime routines are important. Aim for 11-14 hours of sleep per day, including naps.   RTC 2 months

## 2025-07-03 NOTE — PHYSICAL EXAM

## 2025-07-03 NOTE — PHYSICAL EXAM

## 2025-07-03 NOTE — HISTORY OF PRESENT ILLNESS
[Parents] : parents [Normal] : Normal [None] : Primary Fluoride Source: None [Playtime 60 min a day] : Playtime 60 min a day [Temper Tantrums] : Temper Tantrums [No] : No cigarette smoke exposure [Water heater temperature set at <120 degrees F] : Water heater temperature set at <120 degrees F [Car seat in back seat] : Car seat in back seat [Smoke Detectors] : Smoke detectors [Carbon Monoxide Detectors] : Carbon monoxide detectors [Up to date] : Up to date [NO] : No [Exposure to electronic nicotine delivery system] : No exposure to electronic nicotine delivery system [At risk for exposure to TB] : Not at risk for exposure to Tuberculosis [FreeTextEntry7] : Seen by ENT- plan is to do a home sleep study now that she is 2 years old. Likely will need CPAP or other mode of ventilation at night. Seen by AI- normal appearing immunologic work up. Seen by endo- on leptin 18 u, weight stable . Missed pulm appt as mother had an appointment of her own that day.  [de-identified] : Hearing- mother concerned that imerzal does not hear very well since she does not speak very much. She says a few words consistently about 10 words total. Mother would also like to see a neurologist at least once to ensure that Ayzal is "ok". [de-identified] : B: 1/2 a roti and egg whites L: lean meat usually small amount of chicken with vegetables D- the same as lunch, no snacks usually; has 8 oz of 1/2 water and 1/2 2% milk at night [FreeTextEntry3] : sleeping 6-7 hours a night now, still sitting up, still with pauses and snoring. Only slept 1 hour last night, which happens once a month. [de-identified] : does not brush her teeth

## 2025-07-03 NOTE — ASSESSMENT
[TextEntry] : #WCV DENTAL: does not brush her teeth- fluoride was applied at last visit; emphasized that she needs to brush her teeth with toothpaste and establish care with a dentist  VACCINES: up to date SCREENINGS:  SDOH- positive, followed by our patient navigator team, [facility] and healthy steps LABS: CBC, Lead-- please obtain at next lab draw   *ANTICIPATORY GUIDANCE: Nutrition: Offer healthy snacks, limit sugary drinks, and encourage self-feeding. Picky eating is common. Safety:Childproof your home thoroughly (locks, fontana, outlet covers).  Supervise closely around water, streets, and stairs. Car seats are still rear-facing ideally, or forward-facing if they've outgrown the limits of their rear-facing seat. Development:Expect tantrums. Encourage language development by talking, reading, and singing.  Focus on positive discipline and setting clear limits.  Parallel play (playing alongside other children without interacting directly) is typical. Sleep:Consistent bedtime routines are important. Aim for 11-14 hours of sleep per day, including naps.   RTC 2 months

## 2025-07-03 NOTE — DEVELOPMENTAL MILESTONES
[Normal Development] : Normal Development [None] : none [Plays alongside other children] : plays alongside other children [Stacks objects] : stacks objects [Turns book pages] : turns book pages [No] : Not Completed. [Takes off some clothing] : does not take off some clothing [Scoops well with spoon] : does not scoop well with spoon [Uses 50 words] : does not use 50 words [Combine 2 words into phrase or] : does not combine 2 words into phrase or sentences [Follows 2-step command] : does not follow 2-step command [Uses words that are 50% intelligible] : does not use words that are 50% intelligible to strangers [Kicks ball] : does not kick ball [Jumps off ground with 2 feet] : does not jump off ground with 2 feet [Runs with coordination] : does not run with coordination [Climbs up a ladder at a] : does not climb up a ladder at a playground [Uses hands to turn objects] : does not use hands to turn objects [FreeTextEntry1] : Gross: sits up right, not able to crawl, unable to stand or walk due to size; at level of ~6 month old Fine motor skills: pincer grasp, turns pages, unable to turn knobs etc; at level of ~18 month old Speech: speaks 5-10-kenzie words; at level of 12 month old

## 2025-07-10 NOTE — HISTORY OF PRESENT ILLNESS
[de-identified] : rash [FreeTextEntry6] : rash in diaper area was seen in ED and given triple paste getting worse uncomfortable no other issues

## 2025-07-18 NOTE — CONSULT LETTER
[Courtesy Letter:] : I had the pleasure of seeing your patient, [unfilled], in my office today. [Dear  ___] : Dear  [unfilled], [Consult Letter:] : I had the pleasure of evaluating your patient, [unfilled]. [Please see my note below.] : Please see my note below. [Consult Closing:] : Thank you very much for allowing me to participate in the care of this patient.  If you have any questions, please do not hesitate to contact me. [Sincerely,] : Sincerely, [FreeTextEntry3] : Dr. Sue Rene Department of Dermatology [facility]

## 2025-07-18 NOTE — PHYSICAL EXAM
[Healthy Appearing] : healthy appearing [Interactive] : interactive [Obese] : obese [Normal Appearance] : normal appearance [Well formed] : well formed [Normally Set] : normally set [Goiter] : no goiter [Upper Airway Sounds] : transmitted upper airway sounds [Abdomen Soft] : soft [Abdomen Tenderness] : non-tender [] : no hepatosplenomegaly [Normal] : normal [de-identified] : morbidly obese, smiling [de-identified] : red eczematous rash over cheeks. dry skin over forearms around the bilateral wrists with a couple of excoriated spots.. [de-identified] : deferred [Alert] : alert [Oriented x 3] : ~L oriented x 3 [Well Nourished] : well nourished [Conjunctiva Non-injected] : conjunctiva non-injected [No Visual Lymphadenopathy] : no visual  lymphadenopathy [No Clubbing] : no clubbing [No Edema] : no edema [No Bromhidrosis] : no bromhidrosis [No Chromhidrosis] : no chromhidrosis [FreeTextEntry3] : well-demarcated red plaques in the bl inner thighs w few surrounding erythematous papules and pustule and collarettes of scale  xerosis

## 2025-07-18 NOTE — CONSULT LETTER
[Courtesy Letter:] : I had the pleasure of seeing your patient, [unfilled], in my office today. [Dear  ___] : Dear  [unfilled], [Consult Letter:] : I had the pleasure of evaluating your patient, [unfilled]. [Please see my note below.] : Please see my note below. [Consult Closing:] : Thank you very much for allowing me to participate in the care of this patient.  If you have any questions, please do not hesitate to contact me. [Sincerely,] : Sincerely, [FreeTextEntry3] : Dr. Sue Rene Department of Dermatology [facility]

## 2025-07-18 NOTE — HISTORY OF PRESENT ILLNESS
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 Treatment with metreleptin was started with FDA and IRB approval under compassionate use. She was diagnosed with hypothyroidism in [place] and in June 2024 we have lowered her levothyroxine dose to 25mcg daily due to suppressed TSH.   Labs April 2025: TFTs wnl, leptin 0.8ng/mL, CMP ALT 84 -> 45; neg celiac  She was last seen 5/23 for follow-up regarding metreleptin therapy. Receiving 18u metreleptin without missed doses since last increase 5/2/25 (0.06mg/kg/d based on LBW).  She returns for follow up. Mom is injecting 18u of metreleptin (0.9mg) in her abdomen, arm or thighs at 11pm. Regarding food intake and asking for food: she is eating regularly and not asking for food in excess. Eats small amounts of food at a time. Asks for food when she sees it and mom has. No side effects from injections. Mom was told that the sleep study will be arranged for after she turns 2 years old. Regarding development: Speech is still delayed. Says reese sawyer, this, yes or no. Less than 10 words total. She understands what mom tells her. Mom notices she has been trying to stand up which is new. She sits up by herself and is stable. Mom has not heard back about physical therapy and waiting on pulm and neuro appointments. Mom missed nutrition appt on 5/28 - reports she had a fever. She was not confident with injection technique and we have arranged one of our nurses Betsy to watch her do an injection however she did not come for this visit so this was not done. She takes 25mcg levothyroxine daily with no missed doses. Mom gives it via syringe in the morning.   Aneta has an 10 year old sister who is healthy.  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 IRB. She takes levothyroxine 25mcg daily with no missed doses and her TFTs were normal in April 2025.  Since last visit weight has been stable. This is attributed to the increased dosage on 5/2/25 from 12u to 18u daily. She has no significant side effects from metreleptin therapy and is tolerating injections  Work up by immunology is in progress for possible immunodeficiency and so far normal which is reassuring. Awaiting sleep study. Mom also reports waiting on neurology and pulm appointments. Social concerns in the family - SW in primary care following. [facility] is helping the family with appointments and transportation  Metreleptin will continue to be ordered from [employer]. [facility] [place] to provide sterile water for medication dilution. The other supplies (23G syringe needles, 31G insulin syringes, alcohol swabs) are covered by insurance and to be [facility].  She is clinically and biochemically euthyroid on levothyroxine 25mcg. TFTs to be repeated every 3-4months.  Plan: No labs today - next due July or Aug 2025 Follow up with Dr Beasley in 2-3 weeks - will arrange follow up Needs nutrition appt - will arrange for one of our nurses to observe mom mix and administer leptin after that visit Continue levothyroxine 25mcg daily, discussed mixing and administering in yogurt, apple sauce or pudding. To continue Leptin 18u daily (0.9mg SQ daily) - 2 boxes 62 vials provided - Lot [id number] exp 1/2028 They were instructed to call us with questions or concerns. Will touch base with [facility] SW regarding care coordination for PT/neuro/pulm.  Savanna Trujillo MD Pediatric Endocrinology Fellow, PGY5  [Premenarchal] : premenarchal [FreeTextEntry1] : npa - diaper rash [de-identified] : Pt is a 2 year old F w congenital leptin deficiency, presenting for initial eval of diaper rash x a few weeks. The rash is very itchy and bothersome to pt. Mom has tried triple paste and nystatin oint w/o improvement. Using Pampers wipes in the area and Huggies diapers.  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

## 2025-07-18 NOTE — PHYSICAL EXAM
[Healthy Appearing] : healthy appearing [Interactive] : interactive [Obese] : obese [Normal Appearance] : normal appearance [Well formed] : well formed [Normally Set] : normally set [Goiter] : no goiter [Upper Airway Sounds] : transmitted upper airway sounds [Abdomen Soft] : soft [Abdomen Tenderness] : non-tender [] : no hepatosplenomegaly [Normal] : normal [de-identified] : morbidly obese, smiling [de-identified] : red eczematous rash over cheeks. dry skin over forearms around the bilateral wrists with a couple of excoriated spots.. [de-identified] : deferred [Alert] : alert [Oriented x 3] : ~L oriented x 3 [Well Nourished] : well nourished [Conjunctiva Non-injected] : conjunctiva non-injected [No Visual Lymphadenopathy] : no visual  lymphadenopathy [No Clubbing] : no clubbing [No Edema] : no edema [No Bromhidrosis] : no bromhidrosis [No Chromhidrosis] : no chromhidrosis [FreeTextEntry3] : well-demarcated red plaques in the bl inner thighs w few surrounding erythematous papules and pustule and collarettes of scale  xerosis

## 2025-07-18 NOTE — ASSESSMENT
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

## 2025-07-18 NOTE — HISTORY OF PRESENT ILLNESS
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 Treatment with metreleptin was started with FDA and IRB approval under compassionate use. She was diagnosed with hypothyroidism in [place] and in June 2024 we have lowered her levothyroxine dose to 25mcg daily due to suppressed TSH.   Labs April 2025: TFTs wnl, leptin 0.8ng/mL, CMP ALT 84 -> 45; neg celiac  She was last seen 5/23 for follow-up regarding metreleptin therapy. Receiving 18u metreleptin without missed doses since last increase 5/2/25 (0.06mg/kg/d based on LBW).  She returns for follow up. Mom is injecting 18u of metreleptin (0.9mg) in her abdomen, arm or thighs at 11pm. Regarding food intake and asking for food: she is eating regularly and not asking for food in excess. Eats small amounts of food at a time. Asks for food when she sees it and mom has. No side effects from injections. Mom was told that the sleep study will be arranged for after she turns 2 years old. Regarding development: Speech is still delayed. Says reese sawyer, this, yes or no. Less than 10 words total. She understands what mom tells her. Mom notices she has been trying to stand up which is new. She sits up by herself and is stable. Mom has not heard back about physical therapy and waiting on pulm and neuro appointments. Mom missed nutrition appt on 5/28 - reports she had a fever. She was not confident with injection technique and we have arranged one of our nurses Betsy to watch her do an injection however she did not come for this visit so this was not done. She takes 25mcg levothyroxine daily with no missed doses. Mom gives it via syringe in the morning.   Aneta has an 10 year old sister who is healthy.  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 IRB. She takes levothyroxine 25mcg daily with no missed doses and her TFTs were normal in April 2025.  Since last visit weight has been stable. This is attributed to the increased dosage on 5/2/25 from 12u to 18u daily. She has no significant side effects from metreleptin therapy and is tolerating injections  Work up by immunology is in progress for possible immunodeficiency and so far normal which is reassuring. Awaiting sleep study. Mom also reports waiting on neurology and pulm appointments. Social concerns in the family - SW in primary care following. [facility] is helping the family with appointments and transportation  Metreleptin will continue to be ordered from [employer]. [facility] peds pharmacy to provide sterile water for medication dilution. The other supplies (23G syringe needles, 31G insulin syringes, alcohol swabs) are covered by insurance and [facility]  She is clinically and biochemically euthyroid on levothyroxine 25mcg. TFTs to be repeated every 3-4months.  Plan: No labs today - next due July or Aug 2025 Follow up with Dr Beasley in 2-3 weeks - will arrange follow up Needs nutrition appt - will arrange for one of our nurses to observe mom mix and administer leptin after that visit Continue levothyroxine 25mcg daily, discussed mixing and administering in yogurt, apple sauce or pudding. To continue Leptin 18u daily (0.9mg SQ daily) - 2 boxes 62 vials provided - Lot [id number] exp 1/2028 They were instructed to call us with questions or concerns. Will touch base with [facility] SW regarding care coordination for PT/neuro/pulm.  Savanna Trujillo MD Pediatric Endocrinology Fellow, PGY5  [Premenarchal] : premenarchal [FreeTextEntry1] : npa - diaper rash [de-identified] : Pt is a 2 year old F w congenital leptin deficiency, presenting for initial eval of diaper rash x a few weeks. The rash is very itchy and bothersome to pt. Mom has tried triple paste and nystatin oint w/o improvement. Using Pampers wipes in the area and Huggies diapers.  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

## 2025-07-18 NOTE — HISTORY OF PRESENT ILLNESS
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 Treatment with metreleptin was started with FDA and IRB approval under compassionate use. She was diagnosed with hypothyroidism in [place] and in June 2024 we have lowered her levothyroxine dose to 25mcg daily due to suppressed TSH.   Labs April 2025: TFTs wnl, leptin 0.8ng/mL, CMP ALT 84 -> 45; neg celiac  She was last seen 5/23 for follow-up regarding metreleptin therapy. Receiving 18u metreleptin without missed doses since last increase 5/2/25 (0.06mg/kg/d based on LBW).  She returns for follow up. Mom is injecting 18u of metreleptin (0.9mg) in her abdomen, arm or thighs at 11pm. Regarding food intake and asking for food: she is eating regularly and not asking for food in excess. Eats small amounts of food at a time. Asks for food when she sees it and mom has. No side effects from injections. Mom was told that the sleep study will be arranged for after she turns 2 years old. Regarding development: Speech is still delayed. Says reese sawyer, this, yes or no. Less than 10 words total. She understands what mom tells her. Mom notices she has been trying to stand up which is new. She sits up by herself and is stable. Mom has not heard back about physical therapy and waiting on pulm and neuro appointments. Mom missed nutrition appt on 5/28 - reports she had a fever. She was not confident with injection technique and we have arranged one of our nurses Betsy to watch her do an injection however she did not come for this visit so this was not done. She takes 25mcg levothyroxine daily with no missed doses. Mom gives it via syringe in the morning.   Aneta has an 10 year old sister who is healthy.  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 She takes levothyroxine 25mcg daily with no missed doses and her TFTs were normal in April 2025.  Since last visit weight has been stable. This is attributed to the increased dosage on 5/2/25 from 12u to 18u daily. She has no significant side effects from metreleptin therapy and is tolerating injections  Work up by immunology is in progress for possible immunodeficiency and so far normal which is reassuring. Awaiting sleep study. Mom also reports waiting on neurology and pulm appointments. Social concerns in the family - SW in primary care following. [facility] is helping the family with appointments and transportation  Metreleptin will continue to be ordered from [employer]. [facility] peds pharmacy to provide sterile water for medication dilution. The other supplies (23G syringe needles, 31G insulin syringes, alcohol swabs) are covered by insurance and [facility].  She is clinically and biochemically euthyroid on levothyroxine 25mcg. TFTs to be repeated every 3-4months.  Plan: No labs today - next due July or Aug 2025 Follow up with Dr Beasley in 2-3 weeks - will arrange follow up Needs nutrition appt - will arrange for one of our nurses to observe mom mix and administer leptin after that visit Continue levothyroxine 25mcg daily, discussed mixing and administering in yogurt, apple sauce or pudding. To continue Leptin 18u daily (0.9mg SQ daily) - 2 boxes 62 vials provided - Lot [id number] exp 1/2028 They were instructed to call us with questions or concerns. Will touch base with [facility] SW regarding care coordination for PT/neuro/pulm.  Savanna Trujillo MD Pediatric Endocrinology Fellow, PGY5  [Premenarchal] : premenarchal [FreeTextEntry1] : npa - diaper rash [de-identified] : Pt is a 2 year old F w congenital leptin deficiency, presenting for initial eval of diaper rash x a few weeks. The rash is very itchy and bothersome to pt. Mom has tried triple paste and nystatin oint w/o improvement. Using Pampers wipes in the area and Huggies diapers.  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

## 2025-07-18 NOTE — CONSULT LETTER
[Courtesy Letter:] : I had the pleasure of seeing your patient, [unfilled], in my office today. [Dear  ___] : Dear  [unfilled], [Consult Letter:] : I had the pleasure of evaluating your patient, [unfilled]. [Please see my note below.] : Please see my note below. [Consult Closing:] : Thank you very much for allowing me to participate in the care of this patient.  If you have any questions, please do not hesitate to contact me. [Sincerely,] : Sincerely, [FreeTextEntry3] : Dr. Sue Rene Department of Dermatology [facility]

## 2025-07-18 NOTE — PHYSICAL EXAM
[Healthy Appearing] : healthy appearing [Interactive] : interactive [Obese] : obese [Normal Appearance] : normal appearance [Well formed] : well formed [Normally Set] : normally set [Goiter] : no goiter [Upper Airway Sounds] : transmitted upper airway sounds [Abdomen Soft] : soft [Abdomen Tenderness] : non-tender [] : no hepatosplenomegaly [Normal] : normal [de-identified] : morbidly obese, smiling [de-identified] : red eczematous rash over cheeks. dry skin over forearms around the bilateral wrists with a couple of excoriated spots.. [de-identified] : deferred [Alert] : alert [Oriented x 3] : ~L oriented x 3 [Well Nourished] : well nourished [Conjunctiva Non-injected] : conjunctiva non-injected [No Visual Lymphadenopathy] : no visual  lymphadenopathy [No Clubbing] : no clubbing [No Edema] : no edema [No Bromhidrosis] : no bromhidrosis [No Chromhidrosis] : no chromhidrosis [FreeTextEntry3] : well-demarcated red plaques in the bl inner thighs w few surrounding erythematous papules and pustule and collarettes of scale  xerosis

## 2025-08-15 NOTE — ADDENDUM
[FreeTextEntry1] : 7/28/25 spoke to mom, TFTs and CMP normal/unremarkable. Reminded to have Aneta's father stop by to  the Leptin on Friday 9-330pm latest. Mother verbalized understanding.

## 2025-08-15 NOTE — PHYSICAL EXAM
[Goiter] : no goiter [de-identified] : morbidly obese, smiling [de-identified] : red eczematous rash over cheeks [de-identified] : deferred

## 2025-08-15 NOTE — ASSESSMENT
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 IRB. She takes levothyroxine 25mcg daily with no missed doses and her TFTs were normal in April 2025.  Since last visit weight has been stable (recorded wt loss of 0.3kg). She has no significant side effects from metreleptin therapy and is tolerating injections. Mom's injection technique was reviewed by our nurse Betsy, injections are done appropriately. Awaiting PT, sleep study. SW in primary care following. [facility] is helping the family with appointments and transportation. AI work up thus far unremarkable.  Metreleptin will continue to be ordered from [employer]. [facility] peds pharmacy to provide sterile water for medication dilution. The other supplies (23G syringe needles, 31G insulin syringes, alcohol swabs) are covered by insurance and to be [facility]. We provided them with BD Veo half unit 30U syringes today (exp 6.2028) She is clinically and biochemically euthyroid on levothyroxine 25mcg.  Plan: TFTs and CMP today Follow up with Dr Beasley in 3-4 weeks Follow up with Nutrition - soonest Continue levothyroxine 25mcg daily Will increase Leptin to 20u daily (1.0mg SQ daily, 0.2mL). [facility], father to  from the office next week once we receive it. They were instructed to call us with questions or concerns.  Savanna Trujillo MD Pediatric Endocrinology Fellow, PGY6

## 2025-08-15 NOTE — ASSESSMENT
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 She takes levothyroxine 25mcg daily with no missed doses and her TFTs were normal in April 2025.  Since last visit weight has been stable (recorded wt loss of 0.3kg). She has no significant side effects from metreleptin therapy and is tolerating injections. Mom's injection technique was reviewed by our nurse Betsy, injections are done appropriately. Awaiting PT, sleep study. SW in primary care following. [facility] is helping the family with appointments and transportation. AI work up thus far unremarkable.  Metreleptin will continue to be ordered from [employer]. [facility] peds pharmacy to provide sterile water for medication dilution. The other supplies (23G syringe needles, 31G insulin syringes, alcohol swabs) are covered by insurance and to be [facility]. We provided them with BD Veo half unit 30U syringes today (exp 6.2028) She is clinically and biochemically euthyroid on levothyroxine 25mcg.  Plan: TFTs and CMP today Follow up with Dr Beasley in 3-4 weeks Follow up with Nutrition - soonest Continue levothyroxine 25mcg daily Will increase Leptin to 20u daily (1.0mg SQ daily, 0.2mL). [facility], father to  from the office next week once we receive it. They were instructed to call us with questions or concerns.  Savanna Trujillo MD Pediatric Endocrinology Fellow, PGY6

## 2025-08-15 NOTE — ASSESSMENT
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 She takes levothyroxine 25mcg daily with no missed doses and her TFTs were normal in April 2025.  Since last visit weight has been stable (recorded wt loss of 0.3kg). She has no significant side effects from metreleptin therapy and is tolerating injections. Mom's injection technique was reviewed by our nurse Betsy, injections are done appropriately. Awaiting PT, sleep study. SW in primary care following. [facility] is helping the family with appointments and transportation. AI work up thus far unremarkable.  Metreleptin will continue to be ordered from [employer]. [facility] peds pharmacy to provide sterile water for medication dilution. The other supplies (23G syringe needles, 31G insulin syringes, alcohol swabs) are covered by insurance and [facility]. We provided them with BD Veo half unit 30U syringes today (exp 6.2028) She is clinically and biochemically euthyroid on levothyroxine 25mcg.  Plan: TFTs and CMP today Follow up with Dr Beasley in 3-4 weeks Follow up with Nutrition - soonest Continue levothyroxine 25mcg daily Will increase Leptin to 20u daily (1.0mg SQ daily, 0.2mL). [facility], father to  from the office next week once we receive it. They were instructed to call us with questions or concerns.  Savanna Trujillo MD Pediatric Endocrinology Fellow, PGY6

## 2025-08-15 NOTE — HISTORY OF PRESENT ILLNESS
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 Treatment with metreleptin was started with FDA and IRB approval under compassionate use. She was diagnosed with central hypothyroidism in [place] and in June 2024 we have lowered her levothyroxine dose to 25mcg daily due to suppressed TSH.   Labs April 2025: TFTs wnl, leptin 0.8ng/mL, CMP ALT 84 -> 45; neg celiac  She was last seen 6/13 for follow-up regarding metreleptin therapy. Receiving 18u metreleptin without missed doses since last increase 5/2/25 (0.06mg/kg/d based on LBW).  She returns for follow up. Mom is injecting 18u of metreleptin (0.9mg) in her abdomen, arm or thighs in the morning. Regarding food intake and asking for food: she is eating regularly and not asking for food in excess. Asks for food when she sees. No side effects from injections. Still awaiting authorization for sleep study. Sleeps better though - less snoring, 7-8hours per night. Regarding development: She understands what mom tells her. Mom notices she has been trying to stand up but can't. She sits up by herself and is stable. Mom has not heard back about physical therapy. Has not seen nutrition since 11/2024. She saw dermatology and was diagnosed with fungal rash. Mom was not confident with injection technique and will have our nurse Betsy review injection administration with mom (without actually giving it as Aneta already got it this morning) She takes 25mcg levothyroxine daily with no missed doses. Mom gives it via syringe in the morning.    Aneta has an 10 year old sister who is healthy.

## 2025-08-15 NOTE — PHYSICAL EXAM
[Goiter] : no goiter [de-identified] : morbidly obese, smiling [de-identified] : red eczematous rash over cheeks [de-identified] : deferred

## 2025-08-15 NOTE — HISTORY OF PRESENT ILLNESS
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 Treatment with metreleptin was started with FDA and IRB approval under compassionate use. She was diagnosed with central hypothyroidism in [place] and in June 2024 we have lowered her levothyroxine dose to 25mcg daily due to suppressed TSH.   Labs April 2025: TFTs wnl, leptin 0.8ng/mL, CMP ALT 84 -> 45; neg celiac  She was last seen 6/13 for follow-up regarding metreleptin therapy. Receiving 18u metreleptin without missed doses since last increase 5/2/25 (0.06mg/kg/d based on LBW).  She returns for follow up. Mom is injecting 18u of metreleptin (0.9mg) in her abdomen, arm or thighs in the morning. Regarding food intake and asking for food: she is eating regularly and not asking for food in excess. Asks for food when she sees. No side effects from injections. Still awaiting authorization for sleep study. Sleeps better though - less snoring, 7-8hours per night. Regarding development: She understands what mom tells her. Mom notices she has been trying to stand up but can't. She sits up by herself and is stable. Mom has not heard back about physical therapy. Has not seen nutrition since 11/2024. She saw dermatology and was diagnosed with fungal rash. Mom was not confident with injection technique and will have our nurse Betsy review injection administration with mom (without actually giving it as Aneta already got it this morning) She takes 25mcg levothyroxine daily with no missed doses. Mom gives it via syringe in the morning.    Aneta has an 10 year old sister who is healthy.

## 2025-08-15 NOTE — HISTORY OF PRESENT ILLNESS
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 Treatment with metreleptin was started with FDA and IRB approval under compassionate use. She was diagnosed with central hypothyroidism in [place] and in June 2024 we have lowered her levothyroxine dose to 25mcg daily due to suppressed TSH.   Labs April 2025: TFTs wnl, leptin 0.8ng/mL, CMP ALT 84 -> 45; neg celiac  She was last seen 6/13 for follow-up regarding metreleptin therapy. Receiving 18u metreleptin without missed doses since last increase 5/2/25 (0.06mg/kg/d based on LBW).  She returns for follow up. Mom is injecting 18u of metreleptin (0.9mg) in her abdomen, arm or thighs in the morning. Regarding food intake and asking for food: she is eating regularly and not asking for food in excess. Asks for food when she sees. No side effects from injections. Still awaiting authorization for sleep study. Sleeps better though - less snoring, 7-8hours per night. Regarding development: She understands what mom tells her. Mom notices she has been trying to stand up but can't. She sits up by herself and is stable. Mom has not heard back about physical therapy. Has not seen nutrition since 11/2024. She saw dermatology and was diagnosed with fungal rash. Mom was not confident with injection technique and will have our nurse Betsy review injection administration with mom (without actually giving it as Aneta already got it this morning) She takes 25mcg levothyroxine daily with no missed doses. Mom gives it via syringe in the morning.    Aneta has an 10 year old sister who is healthy.

## 2025-08-15 NOTE — PHYSICAL EXAM
[Goiter] : no goiter [de-identified] : morbidly obese, smiling [de-identified] : red eczematous rash over cheeks [de-identified] : deferred

## 2025-08-15 NOTE — PHYSICAL EXAM
[Goiter] : no goiter [de-identified] : morbidly obese, smiling [de-identified] : red eczematous rash over cheeks [de-identified] : deferred

## 2025-08-15 NOTE — ASSESSMENT
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 She takes levothyroxine 25mcg daily with no missed doses and her TFTs were normal in April 2025.  Since last visit weight has been stable (recorded wt loss of 0.3kg). She has no significant side effects from metreleptin therapy and is tolerating injections. Mom's injection technique was reviewed by our nurse Betsy, injections are done appropriately. Awaiting PT, sleep study. SW in primary care following. [facility] is helping the family with appointments and transportation. AI work up thus far unremarkable.  Metreleptin will continue to be ordered from [employer]. [facility] peds pharmacy to provide sterile water for medication dilution. The other supplies (23G syringe needles, 31G insulin syringes, alcohol swabs) are covered by insurance and [facility]. We provided them with BD Veo half unit 30U syringes today (exp 6.2028) She is clinically and biochemically euthyroid on levothyroxine 25mcg.  Plan: TFTs and CMP today Follow up with Dr Beasley in 3-4 weeks Follow up with Nutrition - soonest Continue levothyroxine 25mcg daily Will increase Leptin to 20u daily (1.0mg SQ daily, 0.2mL). [facility], father to  from the office next week once we receive it. They were instructed to call us with questions or concerns.  Savanna Trujillo MD Pediatric Endocrinology Fellow, PGY6

## 2025-08-15 NOTE — PHYSICAL EXAM
[Goiter] : no goiter [de-identified] : morbidly obese, smiling [de-identified] : red eczematous rash over cheeks [de-identified] : deferred

## 2025-08-15 NOTE — HISTORY OF PRESENT ILLNESS
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 Treatment with metreleptin was started with FDA and IRB approval under compassionate use. She was diagnosed with central hypothyroidism in [place] and in June 2024 we have lowered her levothyroxine dose to 25mcg daily due to suppressed TSH.   Labs April 2025: TFTs wnl, leptin 0.8ng/mL, CMP ALT 84 -> 45; neg celiac  She was last seen 6/13 for follow-up regarding metreleptin therapy. Receiving 18u metreleptin without missed doses since last increase 5/2/25 (0.06mg/kg/d based on LBW).  She returns for follow up. Mom is injecting 18u of metreleptin (0.9mg) in her abdomen, arm or thighs in the morning. Regarding food intake and asking for food: she is eating regularly and not asking for food in excess. Asks for food when she sees. No side effects from injections. Still awaiting authorization for sleep study. Sleeps better though - less snoring, 7-8hours per night. Regarding development: She understands what mom tells her. Mom notices she has been trying to stand up but can't. She sits up by herself and is stable. Mom has not heard back about physical therapy. Has not seen nutrition since 11/2024. She saw dermatology and was diagnosed with fungal rash. Mom was not confident with injection technique and will have our nurse Betsy review injection administration with mom (without actually giving it as Aneta already got it this morning) She takes 25mcg levothyroxine daily with no missed doses. Mom gives it via syringe in the morning.    Aneta has an 10 year old sister who is healthy.

## 2025-08-15 NOTE — HISTORY OF PRESENT ILLNESS
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 Treatment with metreleptin was started with FDA and IRB approval under compassionate use. She was diagnosed with central hypothyroidism in [place] and in June 2024 we have lowered her levothyroxine dose to 25mcg daily due to suppressed TSH.   Labs April 2025: TFTs wnl, leptin 0.8ng/mL, CMP ALT 84 -> 45; neg celiac  She was last seen 6/13 for follow-up regarding metreleptin therapy. Receiving 18u metreleptin without missed doses since last increase 5/2/25 (0.06mg/kg/d based on LBW).  She returns for follow up. Mom is injecting 18u of metreleptin (0.9mg) in her abdomen, arm or thighs in the morning. Regarding food intake and asking for food: she is eating regularly and not asking for food in excess. Asks for food when she sees. No side effects from injections. Still awaiting authorization for sleep study. Sleeps better though - less snoring, 7-8hours per night. Regarding development: She understands what mom tells her. Mom notices she has been trying to stand up but can't. She sits up by herself and is stable. Mom has not heard back about physical therapy. Has not seen nutrition since 11/2024. She saw dermatology and was diagnosed with fungal rash. Mom was not confident with injection technique and will have our nurse Betsy review injection administration with mom (without actually giving it as Aneta already got it this morning) She takes 25mcg levothyroxine daily with no missed doses. Mom gives it via syringe in the morning.    Aneta has an 10 year old sister who is healthy.